# Patient Record
Sex: FEMALE | Race: ASIAN
[De-identification: names, ages, dates, MRNs, and addresses within clinical notes are randomized per-mention and may not be internally consistent; named-entity substitution may affect disease eponyms.]

---

## 2018-08-15 ENCOUNTER — HOSPITAL ENCOUNTER (OUTPATIENT)
Dept: HOSPITAL 53 - M LAB REF | Age: 63
End: 2018-08-15
Payer: COMMERCIAL

## 2018-08-15 DIAGNOSIS — Z01.419: Primary | ICD-10-CM

## 2018-08-21 LAB — HPV HYBRID CAPTURE II: (no result)

## 2019-03-15 ENCOUNTER — HOSPITAL ENCOUNTER (OUTPATIENT)
Dept: HOSPITAL 53 - M LAB | Age: 64
End: 2019-03-15
Attending: PHYSICIAN ASSISTANT
Payer: COMMERCIAL

## 2019-03-15 DIAGNOSIS — E87.6: Primary | ICD-10-CM

## 2019-03-15 LAB
ALBUMIN SERPL BCG-MCNC: 3.9 GM/DL (ref 3.2–5.2)
ALT SERPL W P-5'-P-CCNC: 19 U/L (ref 12–78)
BILIRUB SERPL-MCNC: 0.4 MG/DL (ref 0.2–1)
BUN SERPL-MCNC: 15 MG/DL (ref 7–18)
CALCIUM SERPL-MCNC: 9 MG/DL (ref 8.8–10.2)
CHLORIDE SERPL-SCNC: 101 MEQ/L (ref 98–107)
CO2 SERPL-SCNC: 33 MEQ/L (ref 21–32)
CREAT SERPL-MCNC: 0.59 MG/DL (ref 0.55–1.3)
GFR SERPL CREATININE-BSD FRML MDRD: > 60 ML/MIN/{1.73_M2} (ref 45–?)
GLUCOSE SERPL-MCNC: 89 MG/DL (ref 70–100)
POTASSIUM SERPL-SCNC: 3.8 MEQ/L (ref 3.5–5.1)
PROT SERPL-MCNC: 7.2 GM/DL (ref 6.4–8.2)
SODIUM SERPL-SCNC: 138 MEQ/L (ref 136–145)

## 2020-02-08 ENCOUNTER — HOSPITAL ENCOUNTER (EMERGENCY)
Dept: HOSPITAL 53 - M ED | Age: 65
Discharge: HOME | End: 2020-02-08
Payer: COMMERCIAL

## 2020-02-08 VITALS — SYSTOLIC BLOOD PRESSURE: 134 MMHG | DIASTOLIC BLOOD PRESSURE: 72 MMHG

## 2020-02-08 VITALS — WEIGHT: 130.07 LBS | BODY MASS INDEX: 22.21 KG/M2 | HEIGHT: 64 IN

## 2020-02-08 DIAGNOSIS — K21.9: ICD-10-CM

## 2020-02-08 DIAGNOSIS — R10.13: Primary | ICD-10-CM

## 2020-02-08 DIAGNOSIS — Z88.6: ICD-10-CM

## 2020-02-08 LAB
ALBUMIN SERPL BCG-MCNC: 3.9 GM/DL (ref 3.2–5.2)
ALT SERPL W P-5'-P-CCNC: 22 U/L (ref 12–78)
AMYLASE SERPL-CCNC: 47 U/L (ref 25–115)
BASOPHILS # BLD AUTO: 0.1 10^3/UL (ref 0–0.2)
BASOPHILS NFR BLD AUTO: 1 % (ref 0–1)
BILIRUB CONJ SERPL-MCNC: 0.1 MG/DL (ref 0–0.2)
BILIRUB SERPL-MCNC: 0.6 MG/DL (ref 0.2–1)
BUN SERPL-MCNC: 11 MG/DL (ref 7–18)
CALCIUM SERPL-MCNC: 8.9 MG/DL (ref 8.8–10.2)
CHLORIDE SERPL-SCNC: 100 MEQ/L (ref 98–107)
CK MB CFR.DF SERPL CALC: 1.6
CK MB SERPL-MCNC: 1.6 NG/ML (ref ?–3.6)
CK SERPL-CCNC: 100 U/L (ref 26–192)
CO2 SERPL-SCNC: 30 MEQ/L (ref 21–32)
CREAT SERPL-MCNC: 0.58 MG/DL (ref 0.55–1.3)
EOSINOPHIL # BLD AUTO: 0 10^3/UL (ref 0–0.5)
EOSINOPHIL NFR BLD AUTO: 0.5 % (ref 0–3)
GFR SERPL CREATININE-BSD FRML MDRD: > 60 ML/MIN/{1.73_M2} (ref 45–?)
GLUCOSE SERPL-MCNC: 93 MG/DL (ref 70–100)
HCT VFR BLD AUTO: 35.2 % (ref 36–47)
HGB BLD-MCNC: 12 G/DL (ref 12–15.5)
LIPASE SERPL-CCNC: 107 U/L (ref 73–393)
LYMPHOCYTES # BLD AUTO: 2.2 10^3/UL (ref 1.5–5)
LYMPHOCYTES NFR BLD AUTO: 37.2 % (ref 24–44)
MCH RBC QN AUTO: 31.9 PG (ref 27–33)
MCHC RBC AUTO-ENTMCNC: 34.1 G/DL (ref 32–36.5)
MCV RBC AUTO: 93.6 FL (ref 80–96)
MONOCYTES # BLD AUTO: 0.4 10^3/UL (ref 0–0.8)
MONOCYTES NFR BLD AUTO: 6.7 % (ref 0–5)
NEUTROPHILS # BLD AUTO: 3.3 10^3/UL (ref 1.5–8.5)
NEUTROPHILS NFR BLD AUTO: 54.4 % (ref 36–66)
PLATELET # BLD AUTO: 308 10^3/UL (ref 150–450)
POTASSIUM SERPL-SCNC: 3.1 MEQ/L (ref 3.5–5.1)
PROT SERPL-MCNC: 6.9 GM/DL (ref 6.4–8.2)
RBC # BLD AUTO: 3.76 10^6/UL (ref 4–5.4)
SODIUM SERPL-SCNC: 137 MEQ/L (ref 136–145)
TROPONIN I SERPL-MCNC: 0.03 NG/ML (ref ?–0.1)
WBC # BLD AUTO: 6 10^3/UL (ref 4–10)

## 2020-02-08 NOTE — ECGEPIP
[facility] - ED

                                       

                                       Test Date:    2020

Pat Name:     ATUL CHENEY         Department:   

Patient ID:   [id number]                 Room:         -

Gender:       Female                   Technician:   magdalene

:          1955               Requested By: Maja Lundborg-Gray 

Order Number: [id number]     Reading MD:   Maja Lundborg-Gray

                                 Measurements

Intervals                              Axis          

Rate:         74                       P:            54

OK:           140                      QRS:          46

QRSD:         93                       T:            51

QT:           391                                    

QTc:          436                                    

                           Interpretive Statements

SINUS RHYTHM

NONSPECIFIC ST T WAVE CHANGES

NO PRIOR ECG FOR COMPARISON

Electronically Signed on 2020 19:21:58 EST by Maja Lundborg-Gray

## 2020-02-08 NOTE — REPVR
PROCEDURE INFORMATION: 

Exam: US Limited Retroperitoneal, Aorta. 

Exam date and time: 2/8/2020 5:13 PM 

Age: 64 years old 

Clinical indication: Abdominal pain; Epigastric; Additional info: Epigastric 

pain, radiates to back 



TECHNIQUE: 

Imaging protocol: Real-time ultrasound of the retroperitoneum with image 

documentation. Exam focused on the aorta. 



COMPARISON: 

No relevant prior studies available. 



FINDINGS: 

Aorta: Mild atherosclerotic changes in the abdominal aorta. Proximal aorta 

measures 2 x 2.8 cm, mid aorta 1.8 x 1.9 cm, and distal aorta 1.5 x 1.7 cm. No 

aneurysm demonstrated. 

Common iliac arteries: Right common iliac artery measures 1 x 1 cm. Left iliac 

artery measures 1.1 x 1.1 cm. 



IMPRESSION: 

Mild atherosclerotic changes in the abdominal aorta. No aneurysm demonstrated.



Electronically signed by: Efrain Schuler On 02/08/2020  17:34:59 PM

## 2020-02-08 NOTE — REP
Clinical:  Epigastric pain.

 

 

Comparison: None .

 

Technique:  PA and lateral.

 

Findings:

The mediastinum and cardiac silhouette are normal.  The lung fields are clear and

without acute consolidation, effusion, or pneumothorax.  The skeletal structures

are intact and normal.

 

Impression:

1.   No acute cardiopulmonary process.

 

 

Electronically Signed by

Hugo Donald MD 02/08/2020 04:09 P

## 2020-03-03 ENCOUNTER — HOSPITAL ENCOUNTER (OUTPATIENT)
Dept: HOSPITAL 53 - M RAD | Age: 65
End: 2020-03-03
Attending: NURSE PRACTITIONER
Payer: COMMERCIAL

## 2020-03-03 DIAGNOSIS — K21.9: Primary | ICD-10-CM

## 2020-03-03 NOTE — REP
Examination Requested: Esophagram Barium Swallow

 

Reason For Exam/Comment: Dysphasia

 

Esophagram:

 

The procedure was performed LEN Andujar, under the direct

supervision of Dr. Salazar. The images were reviewed with Dr. Salazar.

 

A single PA chest x-ray is submitted as a  film.  The superior mediastinal

structures are midline.  The heart size is within normal limits.  The lungs are

clear.

 

Liquid barium and gas producing granules were given in the erect position as well

as liquid barium in the prone oblique position, in order to perform a double

contrast esophagram examination.

 

Oral and pharyngeal stages of the examination were unremarkable.  Esophageal

transport is efficient and there is no esophagitis, stricture, or mucosal ring

noted.  There is no hiatal hernia noted.  Gastroesophageal reflux was visualized

to the level of the eric.

 

Impression:

1. Gastroesophageal reflux to the level of the eric.

 

0.3 minutes of fluoroscopy time was utilized for this procedure. Some

fluoroscopic images are performed with last image hold technology.  These images

require no additional radiation.

 

 

Reviewed by

LEN Su 03/03/2020 12:23 P

Electronically Signed by

Mic Salazar MD 03/03/2020 05:51 P

## 2020-03-18 ENCOUNTER — HOSPITAL ENCOUNTER (OUTPATIENT)
Dept: HOSPITAL 53 - M OPP | Age: 65
Discharge: HOME | End: 2020-03-18
Attending: SURGERY
Payer: COMMERCIAL

## 2020-03-18 VITALS — SYSTOLIC BLOOD PRESSURE: 146 MMHG | DIASTOLIC BLOOD PRESSURE: 74 MMHG

## 2020-03-18 VITALS — HEIGHT: 64 IN | WEIGHT: 123 LBS | BODY MASS INDEX: 21 KG/M2

## 2020-03-18 DIAGNOSIS — K29.70: ICD-10-CM

## 2020-03-18 DIAGNOSIS — Z88.6: ICD-10-CM

## 2020-03-18 DIAGNOSIS — Z79.899: ICD-10-CM

## 2020-03-18 DIAGNOSIS — R13.10: ICD-10-CM

## 2020-03-18 DIAGNOSIS — R10.13: ICD-10-CM

## 2020-03-18 DIAGNOSIS — K21.9: ICD-10-CM

## 2020-03-18 DIAGNOSIS — D13.1: ICD-10-CM

## 2020-03-18 DIAGNOSIS — K64.0: ICD-10-CM

## 2020-03-18 DIAGNOSIS — Z87.891: ICD-10-CM

## 2020-03-18 DIAGNOSIS — I10: ICD-10-CM

## 2020-03-18 DIAGNOSIS — Z12.11: Primary | ICD-10-CM

## 2020-03-18 NOTE — ROOR
________________________________________________________________________________

Patient Name: Edel Canales         Procedure Date: 3/18/2020 8:45 AM

MRN: [id number]                          Account Number: [id number]

YOB: 1955              Age: 64

Room: [facility]                            Gender: Female

Note Status: Finalized                 

________________________________________________________________________________

 

Procedure:           Upper GI endoscopy

Indications:         Suspected gastro-esophageal reflux disease

Providers:           DO Anusha Landry MD:        LAISHA Lindsey

Requesting Provider: 

Medicines:           Propofol per Anesthesia

Complications:       No immediate complications.

________________________________________________________________________________

Procedure:           Pre-Anesthesia Assessment:

                     - Prior to the procedure, a History and Physical was 

                     performed, and patient medications and allergies were 

                     reviewed. The patient is competent. The risks and 

                     benefits of the procedure and the sedation options and 

                     risks were discussed with the patient. All questions were 

                     answered and informed consent was obtained. Patient 

                     identification and proposed procedure were verified by 

                     the physician, the nurse, the anesthesiologist and the 

                     technician in the endoscopy suite. Mental Status 

                     Examination: alert and oriented. Airway Examination: 

                     normal oropharyngeal airway and neck mobility. 

                     Respiratory Examination: clear to auscultation. CV 

                     Examination: normal. Prophylactic Antibiotics: The 

                     patient does not require prophylactic antibiotics. Prior 

                     Anticoagulants: The patient has taken no previous 

                     anticoagulant or antiplatelet agents. ASA Grade 

                     Assessment: II - A patient with mild systemic disease. 

                     After reviewing the risks and benefits, the patient was 

                     deemed in satisfactory condition to undergo the 

                     procedure. The anesthesia plan was to use monitored 

                     anesthesia care (MAC). Immediately prior to 

                     administration of medications, the patient was 

                     re-assessed for adequacy to receive sedatives. The heart 

                     rate, respiratory rate, oxygen saturations, blood 

                     pressure, adequacy of pulmonary ventilation, and response 

                     to care were monitored throughout the procedure. The 

                     physical status of the patient was re-assessed after the 

                     procedure.

                     The Endoscope was introduced through the mouth, and 

                     advanced to the second part of duodenum. The upper GI 

                     endoscopy was accomplished without difficulty. The 

                     patient tolerated the procedure well.

                                                                                

Findings:

     Scattered moderate inflammation characterized by adherent blood, 

     congestion (edema), erosions and linear erosions was found in the 

     prepyloric region of the stomach. Biopsies were taken with a cold forceps 

     for Helicobacter pylori testing. Estimated blood loss was minimal.

     The exam was otherwise without abnormality.

                                                                                

Impression:          - Gastritis. Biopsied.

                     - The examination was otherwise normal.

Recommendation:      - Patient has a contact number available for emergencies. 

                     The signs and symptoms of potential delayed complications 

                     were discussed with the patient. Return to normal 

                     activities tomorrow. Written discharge instructions were 

                     provided to the patient.

                     - Await pathology results.

                     - Return to my office in 1 week.

                                                                                

 

_________________

Chris Krishna DO

3/18/2020 9:18:30 AM

Electronically signed by Chris Krishna DO

Number of Addenda: 0

 

Note Initiated On: 3/18/2020 8:45 AM

Estimated Blood Loss:

     Estimated blood loss was minimal.

## 2020-03-18 NOTE — ROOR
________________________________________________________________________________

Patient Name: Edel Canales         Procedure Date: 3/18/2020 8:45 AM

MRN: [id number]                          Account Number: [id number]

YOB: 1955              Age: 64

Room: [facility]                            Gender: Female

Note Status: Finalized                 

________________________________________________________________________________

 

Procedure:           Colonoscopy

Indications:         Screening for colorectal malignant neoplasm

Providers:           DO Anusha Landry MD:        LAISHA Lindsey

Requesting Provider: 

Medicines:           Propofol per Anesthesia

Complications:       No immediate complications.

________________________________________________________________________________

Procedure:           Pre-Anesthesia Assessment:

                     - Prior to the procedure, a History and Physical was 

                     performed, and patient medications and allergies were 

                     reviewed. The patient is competent. The risks and 

                     benefits of the procedure and the sedation options and 

                     risks were discussed with the patient. All questions were 

                     answered and informed consent was obtained. Patient 

                     identification and proposed procedure were verified by 

                     the physician, the nurse, the anesthesiologist and the 

                     technician in the endoscopy suite. Mental Status 

                     Examination: alert and oriented. Airway Examination: 

                     normal oropharyngeal airway and neck mobility. 

                     Respiratory Examination: clear to auscultation. CV 

                     Examination: normal. Prophylactic Antibiotics: The 

                     patient does not require prophylactic antibiotics. Prior 

                     Anticoagulants: The patient has taken no previous 

                     anticoagulant or antiplatelet agents. ASA Grade 

                     Assessment: II - A patient with mild systemic disease. 

                     After reviewing the risks and benefits, the patient was 

                     deemed in satisfactory condition to undergo the 

                     procedure. The anesthesia plan was to use monitored 

                     anesthesia care (MAC). Immediately prior to 

                     administration of medications, the patient was 

                     re-assessed for adequacy to receive sedatives. The heart 

                     rate, respiratory rate, oxygen saturations, blood 

                     pressure, adequacy of pulmonary ventilation, and response 

                     to care were monitored throughout the procedure. The 

                     physical status of the patient was re-assessed after the 

                     procedure.

                     The Colonoscope was introduced through the anus and 

                     advanced to the cecum, identified by appendiceal orifice 

                     and ileocecal valve. The colonoscopy was performed 

                     without difficulty. The patient tolerated the procedure 

                     well.

                                                                                

Findings:

     Hemorrhoids were found on perianal exam.

     Non-bleeding internal hemorrhoids were found during retroflexion. The 

     hemorrhoids were Grade I (internal hemorrhoids that do not prolapse).

     The exam was otherwise without abnormality on direct and retroflexion 

     views.

                                                                                

Impression:          - Hemorrhoids found on perianal exam.

                     - Non-bleeding internal hemorrhoids.

                     - The examination was otherwise normal on direct and 

                     retroflexion views.

                     - No specimens collected.

Recommendation:      - Patient has a contact number available for emergencies. 

                     The signs and symptoms of potential delayed complications 

                     were discussed with the patient. Return to normal 

                     activities tomorrow. Written discharge instructions were 

                     provided to the patient.

                     - Repeat colonoscopy in 5-10 years for screening purposes.

                     - Return to my office PRN.

                                                                                

 

_________________

Chris Krishna DO

3/18/2020 9:21:29 AM

Electronically signed by Chris Krishna DO

Number of Addenda: 0

 

Note Initiated On: 3/18/2020 8:45 AM

Estimated Blood Loss:

     Estimated blood loss: none.

## 2020-09-25 ENCOUNTER — HOSPITAL ENCOUNTER (OUTPATIENT)
Dept: HOSPITAL 53 - M LAB | Age: 65
End: 2020-09-25
Attending: PHYSICIAN ASSISTANT
Payer: COMMERCIAL

## 2020-09-25 DIAGNOSIS — I10: ICD-10-CM

## 2020-09-25 DIAGNOSIS — K21.9: Primary | ICD-10-CM

## 2020-09-25 LAB
ALBUMIN SERPL BCG-MCNC: 3.7 GM/DL (ref 3.2–5.2)
ALT SERPL W P-5'-P-CCNC: 20 U/L (ref 12–78)
BASOPHILS # BLD AUTO: 0.1 10^3/UL (ref 0–0.2)
BASOPHILS NFR BLD AUTO: 0.9 % (ref 0–1)
BILIRUB SERPL-MCNC: 0.5 MG/DL (ref 0.2–1)
BUN SERPL-MCNC: 16 MG/DL (ref 7–18)
CALCIUM SERPL-MCNC: 8.7 MG/DL (ref 8.8–10.2)
CHLORIDE SERPL-SCNC: 109 MEQ/L (ref 98–107)
CHOLEST SERPL-MCNC: 179 MG/DL (ref ?–200)
CHOLEST/HDLC SERPL: 3.09 {RATIO} (ref ?–5)
CO2 SERPL-SCNC: 28 MEQ/L (ref 21–32)
CREAT SERPL-MCNC: 0.6 MG/DL (ref 0.55–1.3)
EOSINOPHIL # BLD AUTO: 0.1 10^3/UL (ref 0–0.5)
EOSINOPHIL NFR BLD AUTO: 1.7 % (ref 0–3)
GFR SERPL CREATININE-BSD FRML MDRD: > 60 ML/MIN/{1.73_M2} (ref 45–?)
GLUCOSE SERPL-MCNC: 97 MG/DL (ref 70–100)
HCT VFR BLD AUTO: 38.7 % (ref 36–47)
HDLC SERPL-MCNC: 58 MG/DL (ref 40–?)
HGB BLD-MCNC: 12.8 G/DL (ref 12–15.5)
LDLC SERPL CALC-MCNC: 107 MG/DL (ref ?–100)
LYMPHOCYTES # BLD AUTO: 2.3 10^3/UL (ref 1.5–5)
LYMPHOCYTES NFR BLD AUTO: 44.2 % (ref 24–44)
MCH RBC QN AUTO: 31.5 PG (ref 27–33)
MCHC RBC AUTO-ENTMCNC: 33.1 G/DL (ref 32–36.5)
MCV RBC AUTO: 95.3 FL (ref 80–96)
MONOCYTES # BLD AUTO: 0.4 10^3/UL (ref 0–0.8)
MONOCYTES NFR BLD AUTO: 7.2 % (ref 0–5)
NEUTROPHILS # BLD AUTO: 2.4 10^3/UL (ref 1.5–8.5)
NEUTROPHILS NFR BLD AUTO: 45.6 % (ref 36–66)
NONHDLC SERPL-MCNC: 121 MG/DL
PLATELET # BLD AUTO: 296 10^3/UL (ref 150–450)
POTASSIUM SERPL-SCNC: 3.6 MEQ/L (ref 3.5–5.1)
PROT SERPL-MCNC: 6.9 GM/DL (ref 6.4–8.2)
RBC # BLD AUTO: 4.06 10^6/UL (ref 4–5.4)
SODIUM SERPL-SCNC: 143 MEQ/L (ref 136–145)
TRIGL SERPL-MCNC: 72 MG/DL (ref ?–150)
TSH SERPL DL<=0.005 MIU/L-ACNC: 2.67 UIU/ML (ref 0.36–3.74)
WBC # BLD AUTO: 5.3 10^3/UL (ref 4–10)

## 2020-10-22 ENCOUNTER — HOSPITAL ENCOUNTER (OUTPATIENT)
Dept: HOSPITAL 53 - M LAB REF | Age: 65
End: 2020-10-22
Attending: NURSE PRACTITIONER
Payer: COMMERCIAL

## 2020-10-22 DIAGNOSIS — R31.9: Primary | ICD-10-CM

## 2021-02-13 ENCOUNTER — HOSPITAL ENCOUNTER (EMERGENCY)
Dept: HOSPITAL 53 - M ED | Age: 66
Discharge: HOME | End: 2021-02-13
Payer: COMMERCIAL

## 2021-02-13 VITALS
BODY MASS INDEX: 22.62 KG/M2 | DIASTOLIC BLOOD PRESSURE: 73 MMHG | HEIGHT: 64 IN | WEIGHT: 132.5 LBS | SYSTOLIC BLOOD PRESSURE: 143 MMHG

## 2021-02-13 DIAGNOSIS — I10: ICD-10-CM

## 2021-02-13 DIAGNOSIS — Z88.8: ICD-10-CM

## 2021-02-13 DIAGNOSIS — H93.11: Primary | ICD-10-CM

## 2021-02-13 DIAGNOSIS — Z79.899: ICD-10-CM

## 2021-02-13 DIAGNOSIS — K21.9: ICD-10-CM

## 2021-02-13 NOTE — CCD
Summarization Of Episode

                             Created on: 2021



SHRAVAN ATUL

External Reference #: [id number]

: 1955

Sex: Female



Demographics





                          Address                   [address] [place]  [address]

 

                          Home Phone                [phone]

 

                          Preferred Language        English

 

                          Marital Status            

 

                          Confucianist Affiliation     PRE WATKOR

 

                          Race                      

 

                          Ethnic Group              Not  or 





Author





                          Author                    [facility]

 

                          Organization              [facility]

 

                          Address                   Unknown

 

                          Phone                     Unavailable







Support





                Name            Relationship    Address         Phone

 

                RE              Next Of Kin     Unknown         Unavailable

 

                UE              Next Of Kin     Unknown         Unavailable

 

                    TANYA BARNES  Next Of Kin         [address]

[place]  [address]                     [phone]

 

                VANE LAUREN CHA Next Of Kin     Unknown         Unavailab

[address]     [place]  [address]                     [phone]

 

                    JOSE HORNER Next Of Kin         [address]

[place]  [address]                     [phone]

 

                SHAHEED HORNER Next Of Kin     Unknown         [phone]

 

                UN              Next Of Kin     Unknown         Unavailable

 

                    NONE, PATIENT PER   Next Of Kin         -

                                        -

                          -, -  -                   -

 

                    CATINA CHENEY    Next Of Kin         [address]

[place]  [address] [phone]







Care Team Providers





                    Care Team Member Name Role                Phone

 

                    Scaarono, M Ese PA Unavailable         Unavailable

 

                    Scordo, M Ese PA Unavailable         Unavailable

 

                    Scordo, M Ese PA Unavailable         Unavailable

 

                    Scordo, M Ese PA Unavailable         Unavailable

 

                    Scordo, M Ese PA Unavailable         Unavailable

 

                    Scordo, M Ese PA Unavailable         Unavailable

 

                    Scordo, M Ese PA Unavailable         Unavailable

 

                    Scordo, M Ese PA Unavailable         Unavailable

 

                    Scordo, M Ese PA Unavailable         Unavailable

 

                    Scordo, M Ese PA Unavailable         Unavailable

 

                    Scordo, M Ese PA Unavailable         Unavailable

 

                    Scordo, M Ese PA Unavailable         Unavailable

 

                    Scordo, M Ese PA Unavailable         Unavailable

 

                    Scordo, M Ese PA Unavailable         Unavailable

 

                    Scordo, M Ese PA Unavailable         Unavailable

 

                    Scordo, M Ese PA Unavailable         Unavailable

 

                    Scordo, M Ese PA Unavailable         Unavailable

 

                    Scordo, M Ese PA Unavailable         Unavailable

 

                    Scordo, M Ese PA Unavailable         Unavailable

 

                    Scordo, M Ese PA Unavailable         Unavailable

 

                    Scordo, M Ese PA Unavailable         Unavailable

 

                    Scordo, M Ese PA Unavailable         Unavailable

 

                    Scordo, M Ese PA Unavailable         Unavailable

 

                    Scordo, M Ese PA Unavailable         Unavailable

 

                    Scordo, M Ese PA Unavailable         Unavailable

 

                    Scordo, M Ese PA Unavailable         Unavailable

 

                    Scordo, M Ese PA Unavailable         Unavailable

 

                    Scordo, M Ese PA Unavailable         Unavailable

 

                    Scordo, M Ese PA Unavailable         Unavailable

 

                    Scordo, M Ese PA Unavailable         Unavailable

 

                    Scordo, M Ese PA Unavailable         Unavailable

 

                    Scordo, M Ese PA Unavailable         Unavailable

 

                    Scordo, M Ese PA Unavailable         Unavailable

 

                    Scordo, M Ese PA Unavailable         Unavailable

 

                    Scordo, M Ese PA Unavailable         Unavailable

 

                    Scordo, M Ese PA Unavailable         Unavailable

 

                    Scordo, M Ese PA Unavailable         Unavailable

 

                    Scordo, M Ese PA Unavailable         Unavailable

 

                    Scordo, M Ese PA Unavailable         Unavailable

 

                    Scordo, M Ese PA Unavailable         Unavailable

 

                    Scordo, M Ese PA Unavailable         Unavailable

 

                    Scordo, M Ese PA Unavailable         Unavailable

 

                    Pleskach,  Agatha FNP Unavailable         Unavailable

 

                    Pleskach,  Agatha FNP Unavailable         Unavailable

 

                    Pleskach,  Agatha FNP Unavailable         Unavailable

 

                    Pleskach,  Agatha FNP Unavailable         Unavailable

 

                    Pleskach,  Agatha FNP Unavailable         Unavailable

 

                    Pleskach,  Agatha FNP Unavailable         Unavailable

 

                    Pleskach,  Agatha FNP Unavailable         Unavailable

 

                    Pleskach,  Agatha FNP Unavailable         Unavailable

 

                    Pleskach,  Agatha FNP Unavailable         Unavailable

 

                    Pleskach,  Agatha FNP Unavailable         Unavailable

 

                    Pleskach,  Agatha FNP Unavailable         Unavailable

 

                    Pleskach,  Agatha FNP Unavailable         Unavailable

 

                    Pleskach,  Agatha FNP Unavailable         Unavailable

 

                    Pleskach,  Agatha FNP Unavailable         Unavailable

 

                    Pleskach,  Agatha FNP Unavailable         Unavailable

 

                    Pleskach,  Agatha FNP Unavailable         Unavailable

 

                    Pleskach,  Agatha FNP Unavailable         Unavailable

 

                    Pleskach,  Agatha FNP Unavailable         Unavailable

 

                    Pleskach,  Agatha FNP Unavailable         Unavailable

 

                    Pleskach,  Agatha FNP Unavailable         Unavailable

 

                    Pleskach,  Agatha FNP Unavailable         Unavailable

 

                    Pleskach,  Agatha FNP Unavailable         Unavailable

 

                    Pleskach,  Agatha FNP Unavailable         Unavailable

 

                    Pleskach,  Agatha FNP Unavailable         Unavailable

 

                    Pleskach,  Agatha FNP Unavailable         Unavailable

 

                    Pleskach,  Agatha FNP Unavailable         Unavailable

 

                    Pleskach,  Agatha FNP Unavailable         Unavailable

 

                    Pleskach,  Agatha FNP Unavailable         Unavailable

 

                    Pleskach,  Agatha FNP Unavailable         Unavailable

 

                    Pleskach,  Agatha FNP Unavailable         Unavailable

 

                    Scordo, M Ese PA Unavailable         Unavailable

 

                    Scordo, M Ese PA Unavailable         Unavailable

 

                    Scordo, M Ese PA Unavailable         Unavailable

 

                    Scordo, M Ese PA Unavailable         Unavailable

 

                    Scordo, M Ese PA Unavailable         Unavailable

 

                    Scordo, M Ese PA Unavailable         Unavailable

 

                    Scordo, M Ese PA Unavailable         Unavailable

 

                    Scordo, M Ese PA Unavailable         Unavailable

 

                    Scordo, M Ese PA Unavailable         Unavailable

 

                    Scordo, M Ese PA Unavailable         Unavailable

 

                    Scordo, M Ese PA Unavailable         Unavailable

 

                    Scordo, M Ese PA Unavailable         Unavailable

 

                    Scordo, M Ese PA Unavailable         Unavailable

 

                    Scordo, M Ese PA Unavailable         Unavailable

 

                    Scordo, M Ese PA Unavailable         Unavailable

 

                    Scordo, M Ese PA Unavailable         Unavailable

 

                    Scordo, M Ese PA Unavailable         Unavailable

 

                    Scordo, M Ese PA Unavailable         Unavailable

 

                    Scordo, M Ese PA Unavailable         Unavailable

 

                    Scordo, M Ese PA Unavailable         Unavailable

 

                    Scordo, M Ese PA Unavailable         Unavailable

 

                    Scordo, M Ese PA Unavailable         Unavailable

 

                    Scordo, M Ese PA Unavailable         Unavailable

 

                    Scordo, M Ese PA Unavailable         Unavailable

 

                    Scordo, M Ese PA Unavailable         Unavailable

 

                    Scordo, M Ese PA Unavailable         Unavailable

 

                    Scordo, M Ese PA Unavailable         Unavailable

 

                    Scordo, M Ese PA Unavailable         Unavailable

 

                    Scordo, M Ese PA Unavailable         Unavailable

 

                    Scordo, M Ese PA Unavailable         Unavailable

 

                    Scordo, M Ese PA Unavailable         Unavailable

 

                    Scordo, M Ese PA Unavailable         Unavailable

 

                    Scordo, M Ese PA Unavailable         Unavailable

 

                    Scordo, M Ese PA Unavailable         Unavailable

 

                    Scordo, M Ese PA Unavailable         Unavailable

 

                    Scordo, M Ese PA Unavailable         Unavailable

 

                    Scordo, M Ese PA Unavailable         Unavailable

 

                    Scordo, M Ese PA Unavailable         Unavailable

 

                    Scordo, M Ese PA Unavailable         Unavailable

 

                    Scordo, M Ese PA Unavailable         Unavailable

 

                    Scordo, M Ese PA Unavailable         Unavailable

 

                    Scordo, M Ese PA Unavailable         Unavailable



                                  



Re-disclosure Warning

          The records that you are about to access may contain information from 
federally-assisted alcohol or drug abuse programs. If such information is 
present, then the following federally mandated warning applies: This information
has been disclosed to you from records protected by federal confidentiality 
rules (42 CFR part 2). The federal rules prohibit you from making any further 
disclosure of this information unless further disclosure is expressly permitted 
by the written consent of the person to whom it pertains or as otherwise 
permitted by 42 CFR part 2. A general authorization for the release of medical 
or other information is NOT sufficient for this purpose. The Federal rules 
restrict any use of the information to criminally investigate or prosecute any 
alcohol or drug abuse patient.The records that you are about to access may 
contain highly sensitive health information, the redisclosure of which is 
protected by Article 27-F of the [facility] Public Health law. If you 
continue you may have access to information: Regarding HIV / AIDS; Provided by 
facilities licensed or operated by the [facility] Office of Mental Health; 
or Provided by the [facility] Office for People With Developmental 
Disabilities. If such information is present, then the following [place] 
mandated warning applies: This information has been disclosed to you from 
confidential records which are protected by state law. State law prohibits you 
from making any further disclosure of this information without the specific 
written consent of the person to whom it pertains, or as otherwise permitted by 
law. Any unauthorized further disclosure in violation of state law may result in
a fine or [facility] sentence or both. A general authorization for the release of 
medical or other information is NOT sufficient authorization for further disc
losure.                                                                         
    



Family History

          



             Family Member Name Family Member Gender Family Member Status Date o

f Status 

Description                             Data Source(s)

 

           Unknown    Unknown    Problem                          [facility]

 

           Unknown    Unknown    Problem                          [facility]

 

           Unknown    Female     Problem                          [facility]



                                                                                
                           



Encounters

          



           Encounter  Providers  Location   Date       Indications Data Source(s

)

 

             Outpatient   Attender: Agatha John [facility] Main Office  02/10/2021 0

9:00:00 AM EST  

                                        [facility]

 

             Outpatient   Attender: Agatha John [facility]  10/22/2020 0

9:45:00 AM EDT  

                                        [facility]

 

             Outpatient   Attender: Agatha John [facility] Main [facility]  10/06/2020 1

0:45:00 AM EDT  

                                        [facility]

 

           Outpatient Attender: Ese INTERIANO            2020 10:23:00 

AM EDT SCREEN     [facility]

 

                                        SCREEN 

 

           Outpatient Attender: Ese INTERIANO Main Office 2020 10:00:00

 AM EDT            

[facility]

 

             Outpatient   Attender: Ese INTERIANO              2020 01:

00:00 PM EDT SCREEN, DENSE

Z12.31                                  [facility]

 

                                        SCREEN, DENSE Z12.31 

 

           Outpatient                       2020 08:03:00 AM EST          

  [facility]

 

           Outpatient Attender: Ese INTERIANO Main Office 2020 09:15:00

 AM EST            

[facility]



                                                                                
                                                                             



Immunizations

          



             Vaccine      Date         Status       Description  Data Source(s)

 

                          INFLUENZA VIRUS VACCINE QUADRIVALENT 2020-21 (6 MOS AN

D UP) 2020 12:00:00 

AM EDT              completed                               [facility]

 

                          VARICELLA-ZOSTER VIRUS GLYCOPROTEIN E,REC/AS01B ADJUVA

NT/PF 2020 12:00:00 

AM EDT              completed                               [facility]

 

             pneumococcal polysaccharide PPV23 2020 07:24:00 AM EDT comple

florentino                 [facility] 

([facility]

 

                    PNEUMOCOCCAL 23-VALENT POLYSACCHARIDE VACCINE 2020 12:

00:00 AM EDT 

completed                                           [facility]



                                                                                
                                     



Medications

          



          Medication Brand Name Start Date Product Form Dose      Route     Admi

nistrative 

Instructions Pharmacy Instructions Status     Indications Reaction   Description

 Data 

Source(s)

 

                    levocetirizine dihydrochloride 5 MG Oral Tablet Levocetirizi

ne Dihydrochloride 

02/10/2021 12:00:00 AM EST               ORAL                 active            

          [facility]

 

        Azithromycin 250 MG Oral Tablet Azithromycin 02/10/2021 12:00:00 AM EST 

                                

             active                                              [facility]

 

          250 mg              02/10/2021 12:00:00 AM EST tablet    6            

       TAKE TWO TABLETS BY MOUTH AT ONCE

ON THE FIRST DAY THEN TAKE ONE DAILY THEREAFTER TAKE TWO TABLETS BY MOUTH AT 

ONCE ON THE FIRST DAY THEN TAKE ONE DAILY THEREAFTER SOLD: 02/10/2021           

                             

[facility]

 

          0.05 %              10/07/2020 12:00:00 AM EDT ointment  15           

       APPLY TWO TIMES A DAY TO RASH 

ON HANDS AS NEEDED        APPLY TWO TIMES A DAY TO RASH ON HANDS AS NEEDED SOLD:

 

10/08/2020                                                      [facility]

 

          20 mg               10/07/2020 12:00:00 AM EDT tablet    90           

       TAKE ONE TABLET BY MOUTH DAILY 

FOR GUM DISEASE     TAKE ONE TABLET BY MOUTH DAILY FOR GUM DISEASE SOLD: 10/08/2

020    

                                                            [facility]

 

          20 mg               10/07/2020 12:00:00 AM EDT tablet    90           

       TAKE ONE TABLET BY MOUTH DAILY 

FOR GUM DISEASE     TAKE ONE TABLET BY MOUTH DAILY FOR GUM DISEASE SOLD: 01/10/2

021    

                                                            [facility]

 

          3.5-10,000-1 mg/mL-unit/mL-%           10/06/2020 12:00:00 AM EDT solu

tion  10                  APPLY TO

EARS EVERYDAY AS NEEDED FOR ITCHING     APPLY TO EARS EVERYDAY AS NEEDED FOR ITC

SEUN

             SOLD: 10/08/2020                                        [facility]

s

 

          3-5 %               10/06/2020 12:00:00 AM EDT gel       46           

       APPLY TO ACNE AT BEDTIME, WASH OFF 

IN THE MORNING            APPLY TO ACNE AT BEDTIME, WASH OFF IN THE MORNING SOLD

: 

10/08/2020                                                      [facility]

 

          3-5 %               10/06/2020 12:00:00 AM EDT gel       46           

       APPLY TO ACNE AT BEDTIME, WASH OFF 

IN THE MORNING            APPLY TO ACNE AT BEDTIME, WASH OFF IN THE MORNING SOLD

: 

2020                                                      [facility]

 

          3-5 %               10/06/2020 12:00:00 AM EDT gel       46           

       APPLY TO ACNE AT BEDTIME, WASH OFF 

IN THE MORNING            APPLY TO ACNE AT BEDTIME, WASH OFF IN THE MORNING SOLD

: 

2021                                                      [facility]

 

                                        Hydrocortisone 10 MG/ML / Neomycin 3.5 M

G/ML / Polymyxin B 49130 UNT/ML [facility] 

Solution        Neomycin/Polymyxin/Hydrocortisone (Otic) 10/06/2020 12:00:00 AM 

EDT                  

                                active                          [facility]

 

        . UNIT          2020 12:00:00 AM EDT Injectable 1               IN

JECT  INJECT  SOLD: 

2020                                                      [facility]

 

        . UNIT          2020 12:00:00 AM EDT Injectable 1               AS

 DIRECTED AS DIRECTED SOLD:

2020                                                      [facility]

 

          300 mg              2020 12:00:00 AM EDT capsule   14           

       TAKE ONE CAPSULE BY MOUTH TWICE

A DAY FOR 7 DAYS          TAKE ONE CAPSULE BY MOUTH TWICE A DAY FOR 7 DAYS SOLD:

 

2020                                                      [facility]

 

        . UNIT          2020 12:00:00 AM EDT Injectable 1               AS

 DIRECTED AS DIRECTED SOLD:

2020                                                      [facility]

 

          20 mg               2020 12:00:00 AM EDT capsule,delayed release

(DR/EC) 180                 TAKE ONE

CAPSULE BY MOUTH TWICE A DAY TAKE ONE CAPSULE BY MOUTH TWICE A DAY SOLD: 

2020                                                      [facility]

 

          20 mg               2020 12:00:00 AM EDT capsule,delayed release

(DR/EC) 180                 TAKE ONE

CAPSULE BY MOUTH TWICE A DAY TAKE ONE CAPSULE BY MOUTH TWICE A DAY SOLD: 

01/10/2021                                                      [facility]

 

          20 mg               2020 12:00:00 AM EDT capsule,delayed release

(DR/EC) 180                 TAKE ONE

CAPSULE BY MOUTH TWICE A DAY TAKE ONE CAPSULE BY MOUTH TWICE A DAY SOLD: 

2020                                                      [facility]

 

          5-12.5 mg           2020 12:00:00 AM EDT tablet    90           

       TAKE 1 TABLET BY MOUTH ONCE 

DAILY      TAKE 1 TABLET BY MOUTH ONCE DAILY SOLD: 10/08/2020                   

               [facility]

 

          5-12.5 mg           2020 12:00:00 AM EDT tablet    90           

       TAKE 1 TABLET BY MOUTH ONCE 

DAILY      TAKE 1 TABLET BY MOUTH ONCE DAILY SOLD: 01/10/2021                   

               [facility]

 

          5-12.5 mg           2020 12:00:00 AM EDT tablet    90           

       TAKE 1 TABLET BY MOUTH ONCE 

DAILY      TAKE 1 TABLET BY MOUTH ONCE DAILY SOLD: 2020                   

               [facility]

 

          5-12.5 mg           2020 12:00:00 AM EDT tablet    90           

       TAKE 1 TABLET BY MOUTH ONCE 

DAILY      TAKE 1 TABLET BY MOUTH ONCE DAILY SOLD: 2020                   

               [facility]

 

          20 mg               2020 12:00:00 AM EDT capsule,delayed release

(DR/EC) 30                  TAKE ONE 

CAPSULE BY MOUTH EVERY MORNING AS DIRECTED TAKE ONE CAPSULE BY MOUTH EVERY 

MORNING AS DIRECTED SOLD: 2020                                        [facility]

 

          17.5-3.13-1.6 gram           03/10/2020 12:00:00 AM EDT recon soln 354

                 USE AS DIRECTED

BY PHYSICIAN USE AS DIRECTED BY PHYSICIAN SOLD: 2020                      

            [facility]

 

          1 gram              2020 12:00:00 AM EST tablet    360          

       TAKE ONE TABLET BY MOUTH UP TO 

FOUR TIMES A DAY BEFORE MEALS AND AT BEDTIME TAKE ONE TABLET BY MOUTH UP TO FOUR

TIMES A DAY BEFORE MEALS AND AT BEDTIME SOLD: 11/15/2020                        

                [facility]

 

          1 gram              2020 12:00:00 AM EST tablet    360          

       TAKE ONE TABLET BY MOUTH UP TO 

FOUR TIMES A DAY BEFORE MEALS AND AT BEDTIME TAKE ONE TABLET BY MOUTH UP TO FOUR

TIMES A DAY BEFORE MEALS AND AT BEDTIME SOLD: 2020                        

                [facility]

 

          1 gram              2020 12:00:00 AM EST tablet    360          

       TAKE ONE TABLET BY MOUTH UP TO 

FOUR TIMES A DAY BEFORE MEALS AND AT BEDTIME TAKE ONE TABLET BY MOUTH UP TO FOUR

TIMES A DAY BEFORE MEALS AND AT BEDTIME SOLD: 2020                        

                [facility]

 

          1 gram              2020 12:00:00 AM EST tablet    20           

       TAKE ONE TABLET BY MOUTH TWICE A

DAY        TAKE ONE TABLET BY MOUTH TWICE A DAY SOLD: 2020                

                  [facility]

 

          3-5 %               10/30/2019 12:00:00 AM EDT gel       46           

       APPLY TO ACNE ONCE DAILY AT BEDTIME,

WASH OFF IN THE MORNING                 APPLY TO ACNE ONCE DAILY AT BEDTIME, WAS

H OFF IN THE 

MORNING      SOLD: 2020                                        [facility]

s

 

          3-5 %               10/30/2019 12:00:00 AM EDT gel       46           

       APPLY TO ACNE ONCE DAILY AT BEDTIME,

WASH OFF IN THE MORNING                 APPLY TO ACNE ONCE DAILY AT BEDTIME, WAS

H OFF IN THE 

MORNING      SOLD: 2020                                        [facility]

s

 

          3-5 %               10/30/2019 12:00:00 AM EDT gel       46           

       APPLY TO [facility] ONCE DAILY AT BEDTIME,

WASH OFF IN THE MORNING                 APPLY TO [facility] ONCE DAILY AT BEDTIME, WAS

H OFF IN THE 

MORNING      SOLD: 2020                                        [facility]

s

 

          3-5 %               10/30/2019 12:00:00 AM EDT gel       46           

       APPLY TO AC[facility] ONCE DAILY AT BEDTIME,

WASH OFF IN THE MORNING                 APPLY TO [facility] ONCE DAILY AT BEDTIME, WAS

H OFF IN THE 

MORNING      SOLD: 2020                                        [facility]

s

 

          5-12.5 mg           10/25/2019 12:00:00 AM EDT tablet    90           

       TAKE ONE TABLET BY MOUTH 

EVERY DAY  TAKE ONE TABLET BY MOUTH EVERY DAY SOLD: 2020                  

                [facility]

 

          20 mg               10/25/2019 12:00:00 AM EDT tablet    90           

       TAKE ONE TABLET BY MOUTH EVERY 

DAY FOR GUM DISEASE       TAKE ONE TABLET BY MOUTH EVERY DAY FOR GUM DISEASE SOL

D: 

2020                                                      [facility]

 

                    silver sulfadiazine 10 MG/ML Topical Cream [SSD] SILVER SULF

ADIAZINE 10/25/2019 

12:00:00 AM EDT cream        50                        APPLY TO AFFECTED AREA(S)

 AS NEEDED APPLY TO AFFECTED

 AREA(S) AS NEEDED SOLD: 10/10/2020                                        [facility]

 

          20 mg               10/25/2019 12:00:00 AM EDT tablet    90           

       TAKE ONE TABLET BY MOUTH EVERY 

DAY FOR GUM DISEASE       TAKE ONE TABLET BY MOUTH EVERY DAY FOR GUM DISEASE SOL

D: 

2020                                                      [facility]



                                                                                
                                                                                
                                                                                
                                                                                
                                                                                
                  



Insurance Providers

          



             Payer name   Policy type / Coverage type Policy ID    Covered party

 ID Covered 

party's relationship to denis Policy Denis             Plan Information

 

          [facility]           [id number]           SP               

   [id number]

 

          R       O         [id number]           S                   [id number]

 

          [employer]                         -O/P           [id number]           18     

             [id number]

 

          [employer]  Commercial [id number]           Self                [id number]

[id number]

 

          [facility]       Commercial [id number]           Self                [id number]

 

          [facility]       HEA       [id number]           S                   [id number]

 

          [facility]           [id number]           SP               

   [id number]

 

          r       Commercial [id number]           Self                [id number]

 

          r       Commercial [id number]           Self                [id number]

 

          r       Commercial [id number]           Self                [id number]

 

          [employer]     Commercial [id number]           Self                [id number]

 

          [employer]                       -O/P           [id number]           18     

             [id number]

 

          NOT ASKED           UNAVAILABLE                               UNAVAILA

BLE

 

          [employer]     Commercial [id number]           Self                [id number]

 

          [facility]     Commercial                     Self                 



                                                                                
                                                                                
                                                                    



Problems, Conditions, and Diagnoses

          



           Code       Display Name Description Problem Type Effective Dates Data

 Source(s)

 

             54261325     Essential hypertension Essential hypertension Problem 

     02/10/2021 

12:00:00 AM EST                         [facility]

 

                [id number]       Gastroesophageal reflux disease Gastroesophageal

 reflux disease 

Problem                   02/10/2021 12:00:00 AM EST [facility]



                                                                                
                            



Surgeries/Procedures

          



             Procedure    Description  Date         Indications  Data Source(s)

 

             Mammogram                 2020 12:00:00 AM EDT              M

[facility]

 

                                        Document: 18 - Mammo Document:  - US 



                                                                                
        



Results

          



                    ID                  Date                Data Source

 

                    [id number]            10/22/2020 11:22:00 AM EDT [facility]









          Name      Value     Range     Interpretation Code Description Data Tamara

rce(s) Supporting 

Document(s)

 

           Bacteria identified in Urine by Culture Laboratory test result       

                           [facility] 

([facility]          

 

                                        FULL REPORT IN LAB NOTES ([facility] and [facility]

).

NO GROWTH



 









                    ID                  Date                Data Source

 

                    [id number]            10/22/2020 11:20:00 AM EDT [facility]









          Name      Value     Range     Interpretation Code Description Data Tamara

rce(s) Supporting 

Document(s)

 

          pH of Urine by Test strip 5                                       [facility]

[facility] ([facility]  

 

          Specific gravity of Urine 1.00                                    [facility]

[facility]  

 

                          Leukocytes [#/area] in Urine sediment by Microscopy hi

 power field Laboratory 

test result                                         [facility]  

 

           Appearance of Urine Laboratory test result                           

       [facility]                              

 

           Color of Urine Laboratory test result                                

  [facility]                                    

 

           Glucose [Presence] in Urine Laboratory test result                   

               [facility]                    

 

           Protein [Presence] in Urine by Test strip Laboratory test result     

                             [facility] 

[facility]          

 

           Ketones [Presence] in Urine by Test strip Laboratory test result     

                             [facility] 

([facility]          

 

           Bilirubin.total [Presence] in Urine by Test strip Laboratory test res

ult                                  

[facility]   

 

           Urobilinogen [Mass/volume] in Urine by Test strip Laboratory test res

ult                                  

[facility]   

 

           Hemoglobin [Presence] in Urine by Test strip Laboratory test result  

                                [facility]

 ([facility]         

 

           Nitrite [Presence] in Urine by Test strip Laboratory test result     

                             [facility] 

[facility]          









                    ID                  Date                Data Source

 

                    [id number]            2020 06:55:00 AM EDT [facility]









          Name      Value     Range     Interpretation Code Description Data Tamara

rce(s) Supporting 

Document(s)

 

           Thyrotropin [Units/volume] in Serum or Plasma 2.670 uIU/ML 0.358-3.74

0                       

[facility]   









                    ID                  Date                Data Source

 

                    [id number]            2020 06:55:00 AM EDT [facility]









          Name      Value     Range     Interpretation Code Description Data Tamara

rce(s) Supporting 

Document(s)

 

          Triglycerides Level 72 mg/dL                                [facility]  

 

          Cholesterol Level 179 mg/dL                               [facility]  

 

          Non-HDL-C 121 mg/dL                               [facility]  

 

          LDL Cholesterol 107 mg/dL                               [facility]  

 

          HDL Cholesterol 58 mg/dL                                [facility]  

 

          Cholesterol Risk Ratio 3.086                                   [facility] 

[facility]  









                    ID                  Date                Data Source

 

                    [id number]            2020 06:55:00 AM EDT [facility]









          Name      Value     Range     Interpretation Code Description Data Tamara

rce(s) Supporting 

Document(s)

 

           Creatinine For GFR 0.60 mg/dL 0.55-1.30                        [facility]

 [facility]                                    

 

          Blood Urea Nitrogen 16 mg/dL  7-18                          [facility]  

 

          Glucose, Fasting 97 mg/dL                          [facility]  

 

          Sodium Level 143 meq/L 136-145                       [facility]  

 

           Glomerular Filtration Rate Laboratory test result                    

              [facility]                    

 

                                        <content>Units are mL/min/1.73 

m2</content><br/><content></content><br/><content>Chronic Kidney Disease Staging
 per NKF:</content><br/><content></content><br/><content>Stage I & II   GFR >=60
       Normal to Mildly Decreased</content><br/><content>Stage III      GFR 30-
59      Moderately Decreased</content><br/><content>Stage IV       GFR 15-29    
  Severely Decreased</content><br/><content>Stage V        GFR <15        Very 
Little GFR Left</content><br/><content>ESRD           GFR <15 on 
RRT</content><br/><content></content> 

 

          Potassium Serum 3.6 meq/L 3.5-5.1                       [facility]  

 

          Carbon Dioxide Level 28 meq/L  21-32                         [facility]  

 

          Chloride Level 109 meq/L                         [facility]  

 

          Ast/Sgot  13 U/L    7-37                          [facility]  

 

          Anion Gap 6 meq/L   8-16                          [facility]  

 

          Calcium Level 8.7 mg/dL 8.8-10.2                      [facility]  

 

          Alt/SGPT  20 U/L    12-78                         [facility]  

 

          Bilirubin,Total 0.5 mg/dL 0.2-1.0                       [facility]  

 

          Alkaline Phosphatase 59 U/L                            [facility]  

 

          Total Protein 6.9 GM/DL 6.4-8.2                       [facility]  

 

          Albumin   3.7 GM/DL 3.2-5.2                       [facility]  

 

          Albumin/Globulin Ratio 1.2       1.2-2.2                       [facility] 

[facility]  









                    ID                  Date                Data Source

 

                    [id number]            2020 06:55:00 AM EDT [facility]









          Name      Value     Range     Interpretation Code Description Data Tamara

rce(s) Supporting 

Document(s)

 

          White Blood Count 5.3 10    4.0-10.0                      [facility]  

 

          Red Blood Count 4.06 10   4.00-5.40                     [facility]  

 

          Hematocrit 38.7 %    36.0-47.0                     [facility]  

 

           Mean Corpuscular Volume 95.3 fl    80.0-96.0                        M

[facility]                                    

 

          Hemoglobin 12.8 g/dL 12.0-15.5                     [facility]  

 

           Mean Corpuscular Hemoglobin 31.5 pg    27.0-33.0                     

   [facility]                              

 

           Mean Corpuscular HGB Conc 33.1 g/dL  32.0-36.5                       

 [facility]                              

 

           Red Cell Distribution Width 12.2 %     11.5-14.5                     

   [facility]                              

 

           Platelet Count, Automated 296 10     150-450                         

 [facility]                                    

 

          Neutrophils % 45.6 %    36.0-66.0                     [facility]  

 

          Mono %    7.2 %     0.0-5.0                       [facility]  

 

          Eos %     1.7 %     0.0-3.0                       [facility]  

 

          Lymph %   44.2 %    24.0-44.0                     [facility]  

 

          Immature Granulocyte % 0.4 %     0-3.0                         [facility] 

[facility]  

 

          Baso %    0.9 %     0.0-1.0                       [facility]  

 

          Nucleated Red Blood Cell % 0.0 %     0-0                           [facility] 

 

 

          Lymph #   2.3 10    1.5-5.0                       [facility]  

 

          Neutrophils # 2.4 10    1.5-8.5                       [facility]  

 

          Mono #    0.4 10    0.0-0.8                       [facility]  

 

          Baso #    0.1 10    0.0-0.2                       [facility]  

 

          Eos #     0.1 10    0.0-0.5                       [facility]  









                    ID                  Date                Data Source

 

                    [id number]        2020 11:16:00 AM EDT [facility] [address]

[place] [address]                

                                  [phone]  NAME                          
                    SEX    PT STATUS         ACCOUNT NUMBER  ATUL CHENEY   REG REF              [id number]    ORDERING
 PHYSICIAN                                LOCATION                 MEDICAL 
RECORD NO.  ESE GREENE                                MAMMO             
       [id number]    ATTENDING PHYSICIAN                               DATE OF 
BIRTH            DATE OF EXAM/TIME  ESE CRANDALL                               
      1955               06/19/20 / 1104    TYPE / EXAM  US Breast - 
Complete Bilat    REASON FOR EXAM  DENSE BREAST SCREENING  BILATERAL ABVS 3D 
SCREENING ULTRASOUND      COMPARISON: Screening mammogram from the same date    
  FINDINGS:      No disturbing-appearing cystic or solid mass identified.       
IMPRESSION:      No sonographic evidence of malignancy. Occasional benign type 
calcifications are seen on mammography.      OVERALL FINAL ASSESSMENT OF 
FINDINGS   BI-RADS 2 - Benign findings            Reported By Celio Whittington MD on 20       Signed By Celio Whittington MD on 20     
                                               
______________________________________________   _______  _______               
                                                                          Date  
      Time  CC:  Celio Whittington MD; ESE GREENE  Techn: BUSMI       
      Trans Dt/Tm:             Trans by: DT             Prt Dt/Tm:    :
 Total DLP =    0.00 mGy-cm     : Total Radiation Dose =    0.0000 mSv 
   Lifetime Dose: 0 mSv   









          Name      Value     Range     Interpretation Code Description Data Tamara

rce(s) Supporting 

Document(s)

 

                                                                       









                    ID                  Date                Data Source

 

                    [id number]        2020 11:09:00 AM EDT [facility] [address]

[place] [address]                

                                  [phone]  NAME                          
                    SEX    PT STATUS         ACCOUNT NUMBER  ATUL CHENEY   REG REF              [id number]    ORDERING
 PHYSICIAN                                LOCATION                 MEDICAL 
RECORD NO.  ESE LEWISO             
       [id number]    ATTENDING PHYSICIAN                               DATE OF 
BIRTH            DATE OF EXAM/TIME  ESE CRANDALL                               
      1955               06/19/20 / 1041    TYPE / EXAM  3D DIG MAMMO 
SCREEN BILAT    REASON FOR EXAM  SCREENING     LAST CLINICAL BREAST EXAM: 4 
weeks ago      FIVE YEAR RISK: 2.1%      LIFETIME RISK: 6.6%      FAMILY HISTORY
 OF BREAST CARCINOMA: None      COMPARISON: 2019 and November 10, 2015
      2D bilateral digital mammogram in the CC and MLO projections was performed
 with supplemental 3D tomosynthesis of both breasts.      FINDINGS:      
Craniocaudad and oblique lateral views of the breasts were obtained. The breasts
 are extremely dense, which lowers the sensitivity of mammography.      
Occasional benign type calcifications are seen bilaterally.      There is no 
dominant mass, suspicious   clustered microcalcification or architectural 
distortion.      IMPRESSION:      No mammographic evidence of malignancy. Yearly
 screening recommended.      OVERALL FINAL ASSESSMENT OF FINDINGS   BI-RADS 2 - 
Benign findings      OVERALL FINAL ASSESSMENT OF THE BREAST COMPOSITION   Breast
 Density Classification: D   Description: The breasts are extremely dense, which
 lowers the sensitivity of mammography.                   This mammogram was 
read with the assistance of M-Sling, an FDA-approved computer-aided detection 
system for mammography.         Reported By Celio Whittington MD on 20 
1109       Signed By Celio Whittington MD on 20 1113                     
                               ______________________________________________   
_______  _______                                                                
                         Date        Time  CC:  Celoi Whittington MD; ESE GREENE  Techn: JOSH             Trans Dt/Tm:             Trans by: DT     
        Prt Dt/Tm:    : Total DLP =    0.00 mGy-cm     : Total
 Radiation Dose =    0.0000 mSv    Lifetime Dose: 0 mSv   









          Name      Value     Range     Interpretation Code Description Data Tamara

rce(s) Supporting 

Document(s)

 

                                                                       









                    ID                  Date                Data Source

 

                    [id number]            2020 03:47:00 PM EST [facility]









          Name      Value     Range     Interpretation Code Description Data Tamara

rce(s) Supporting 

Document(s)

 

           Amylase [Enzymatic activity/volume] in Serum or Plasma 47 U/L     25-

115                           [facility] 

[facility]          

 

                    Lipoprotein lipase [Enzymatic activity/volume] in Serum or P

lasma 107 U/L             

                                                [facility]  









                    ID                  Date                Data Source

 

                    [id number]            2020 03:47:00 PM EST [facility]









          Name      Value     Range     Interpretation Code Description Data Tamara

e(s) Supporting 

Document(s)

 

          Blood Urea Nitrogen 11 mg/dL  7-18                          [facility]  

 

          Glucose, Fasting 93 mg/dL                          [facility]  

 

           Creatinine For GFR 0.58 mg/dL 0.55-1.30                        [facility]

 [facility]                                    

 

          Sodium Level 137 meq/L 136-145                       [facility]  

 

           Glomerular Filtration Rate Laboratory test result                    

              [facility]                    

 

                                        <content>Units are mL/min/1.73 

m2</content><br/><content></content><br/><content>Chronic Kidney Disease Staging
per NKF:</content><br/><content></content><br/><content>Stage I & II   GFR >=60 
     Normal to Mildly Decreased</content><br/><content>Stage III      GFR 30-59 
    Moderately Decreased</content><br/><content>Stage IV       GFR 15-29      
Severely Decreased</content><br/><content>Stage V        GFR <15        Very 
Little GFR Left</content><br/><content>ESRD           GFR <15 on 
RRT</content><br/><content></content> 

 

          Carbon Dioxide Level 30 meq/L  21-32                         [facility]  

 

          Potassium Serum 3.1 meq/L 3.5-5.1                       [facility]  

 

          Chloride Level 100 meq/L                         [facility]  

 

          Anion Gap 7 meq/L   8-16                          [facility]  

 

          Calcium Level 8.9 mg/dL 8.8-10.2                      [facility]  









                    ID                  Date                Data Source

 

                    [id number]            2020 03:47:00 PM EST [facility]









          Name      Value     Range     Interpretation Code Description Data Tamara

e(s) Supporting 

Document(s)

 

          Alt/SGPT  22 U/L    12-78                         [facility]  

 

          Alkaline Phosphatase 54 U/L                            [facility]  

 

          Ast/Sgot  14 U/L    7-37                          [facility]  

 

          Bilirubin,Total 0.6 mg/dL 0.2-1.0                       [facility]  

 

          Bilirubin,Direct 0.1 mg/dL 0.0-0.2                       [facility]  

 

          Total Protein 6.9 GM/DL 6.4-8.2                       [facility]  

 

           Albumin/Globulin Ratio 1.30       1.00-1.93                        [facility]

[facility]                                    

 

          Albumin   3.9 GM/DL 3.2-5.2                       [facility]  









                    ID                  Date                Data Source

 

                    [id number]            2020 03:47:00 PM EST [facility]









          Name      Value     Range     Interpretation Code Description Data [facility](s) Supporting 

Document(s)

 

           CPK Creatine Phosphokinase 100 U/L                             

  [facility]                                    

 

          CK-MB Value Mass 1.6 ng/mL                               [facility]  

 

          Troponin I 0.03 ng/mL                               [facility]  

 

                                        <content>Troponin I Reference Interval f

or [employer] [place] 

LOCI:</content><br/><content></content><br/><content>99th Percentile= 0.00-0.045
ng/ml</content><br/><content></content><br/><content>Risk 
Stratification:</content><br/><content><= 0.10 ng/ml   Decreased Risk for 
Adverse Clinical</content><br/><content>Events.</content><br/><content>0.10-1.50
ng/ml   Increased Risk for Adverse Clinical</content><br/><content>Events. 
Evaluation of additional</content><br/><content>criterion and/or repeat testing 
in 2-6</content><br/><content>hours is suggested to rule out 
myocardial</content><br/><content>damage.</content><br/><content>>= 1.50 ng/ml  
Indicative of Myocardial Injury.</content><br/><content></content> 

 

          MB/CK Relative Index 1.60                                    [facility]  

 

                                        <content>DIAGNOSIS CRITERIA</content><br

/><content>MMB ng/ml       Relative 

Index (RI)</content><br/><content>NON-AMI               < or = 5               
N/A</content><br/><content>GRAY ZONE              > 5                < or = 
4</content><br/><content>AMI                    > 5                   > 
4</content><br/><content></content> 









                    ID                  Date                Data Source

 

                    [id number]            2020 03:47:00 PM EST [facility]









          Name      Value     Range     Interpretation Code Description Data Tamara

rce(s) Supporting 

Document(s)

 

          White Blood Count 6.0 10    4.0-10.0                      [facility]  

 

          Red Blood Count 3.76 10   4.00-5.40                     [facility]  

 

           Mean Corpuscular Volume 93.6 fl    80.0-96.0                        M

[facility]                                    

 

          Hemoglobin 12.0 g/dL 12.0-15.5                     [facility]  

 

          Hematocrit 35.2 %    36.0-47.0                     [facility]  

 

           Mean Corpuscular Hemoglobin 31.9 pg    27.0-33.0                     

   [facility]                              

 

           Mean Corpuscular HGB Conc 34.1 g/dL  32.0-36.5                       

 [facility]                              

 

           Red Cell Distribution Width 11.8 %     11.5-14.5                     

   [facility]                              

 

          Neutrophils % 54.4 %    36.0-66.0                     [facility]  

 

           Platelet Count, Automated 308 10     150-450                         

 [facility]                                    

 

          Mono %    6.7 %     0.0-5.0                       [facility]  

 

          Eos %     0.5 %     0.0-3.0                       [facility]  

 

          Lymph %   37.2 %    24.0-44.0                     [facility]  

 

          Immature Granulocyte % 0.2 %     0-3.0                         [facility] 

[facility]  

 

          Baso %    1.0 %     0.0-1.0                       [facility]  

 

          Nucleated Red Blood Cell % 0.0 %     0-0                           [facility] 



 

          Neutrophils # 3.3 10    1.5-8.5                       [facility]  

 

          Mono #    0.4 10    0.0-0.8                       [facility]  

 

          Lymph #   2.2 10    1.5-5.0                       [facility]  

 

          Baso #    0.1 10    0.0-0.2                       [facility]  

 

          Eos #     0.0 10    0.0-0.5                       [facility]  









                    ID                  Date                Data Source

 

                    [id number]            2020 03:46:00 PM EST [facility]









          Name      Value     Range     Interpretation Code Description Data Tamara

rce(s) Supporting 

Document(s)

 

           Appearance, Urine RFX Laboratory test result                         

         [facility]                              

 

           Color, Urine RFX Laboratory test result                              

    [facility]                                    

 

           Specific Gravity Ur Auto RFX 1.003      1.002-1.035                  

     [facility]                              

 

          PH,Urine RFX 6.0 units 5.0-9.0                       [facility]  

 

           Ketone, Urine Auto RFX Laboratory test result                        

          [facility]                              

 

           Glucose, Urine (Ua) Auto RFX Laboratory test result                  

                [facility]                    

 

           Protein, Urine Auto RFX Laboratory test result                       

           [facility]                              

 

           Nitrite, Urine Auto RFX Laboratory test result                       

           [facility]                              

 

           Bilirubin, Urine Auto RFX Laboratory test result                     

             [facility]                    

 

           Urobilinogen, Urine Auto RFX 0.2 mg/dL  0.0-2.0                      

    [facility]                              

 

           Leukocyte Esterase Ur Auto RFX Laboratory test result                

                  [facility]                    

 

           Blood, Urine Blood RFX Laboratory test result                        

          [facility]                              

 

          RBC, Urine Auto RFX 4 /HPF    0-3                           [facility]  

 

          WBC, Urine Auto RFX 1 /HPF    0-3                           [facility]  

 

           Bacteria, Urine Auto RFX Laboratory test result                      

            [facility]                              

 

           Hyaline Cast, Urine Auto RFX 0 /LPF     0-1                          

    [facility]                                    

 

           Squam Epithelial Cell Ur Aurfx 0 /HPF     0-6                        

      [facility]                                    







                                        Procedure

 

                                          



                                                                                
                                                                                
                                                                  



Social History

          



           Code       Duration   Value      Status     Description Data Source(s

)

 

             Smoking      02/10/2021 12:00:00 AM EST Patient is a former smoker 

completed    Patient 

is a former smoker                      [facility]



                                                                                
                 



Vital Signs

          



                    ID                  Date                Data Source

 

                    UNK                                      









           Name       Value      Range      Interpretation Code Description Data

 Source(s)

 

           Body mass index (BMI) [Ratio] 21.5 kg/m2                       21.5 k

g/m2 [facility]

 

           Ideal body weight 120 [lb_av]                       120 [lb_av] [facility]

[facility]

 

           Oxygen saturation in Arterial blood by Pulse oximetry 98 %           

                  98 %       [facility] 

[facility]

 

           Body weight 125.38 [lb_av]                       125.38 [lb_av] [facility]

[facility]

 

           Body height 64 [in_i]                        64 [in_i]  [facility]

 

                                        5'4" 

 

           Respiratory rate 16 /min                          16 /min    [facility] (

[facility]

 

           Body temperature 97.3 [degF]                       97.3 [degF] [facility]

 [facility]

 

           Heart rate 81 /min                          81 /min    [facility]

 

           Diastolic blood pressure 79 mm[Hg]                        79 mm[Hg]  

[facility]

 

           Systolic blood pressure 128 mm[Hg]                       128 mm[Hg] M

[facility]

 

           Diastolic blood pressure 89 mm[Hg]                        89 mm[Hg]  

[facility]

 

           Systolic blood pressure 162 mm[Hg]                       162 mm[Hg] M

[facility]

 

           Body mass index (BMI) [Ratio] 21.9 kg/m2                       21.9 k

g/m2 [facility]

 

           Ideal body weight 120 [lb_av]                       120 [lb_av] [facility]

[facility]

 

           Body weight 127.38 [lb_av]                       127.38 [lb_av] [facility]

[facility]

 

           Body height 64 [in_i]                        64 [in_i]  [facility]

 

                                        5'4" 

 

           Respiratory rate 16 /min                          16 /min    [facility] (

[facility]

 

           Body temperature 97.5 [degF]                       97.5 [degF] [facility]

 [facility]

 

           Heart rate 73 /min                          73 /min    [facility]

 

           Diastolic blood pressure 79 mm[Hg]                        79 mm[Hg]  

[facility]

 

           Systolic blood pressure 140 mm[Hg]                       140 mm[Hg] M

[facility]

 

           Body mass index (BMI) [Ratio] 22.1 kg/m2                       22.1 k

g/m2 [facility]

 

           Ideal body weight 120 [lb_av]                       120 [lb_av] [facility]

[facility]

 

           Body weight 128.50 [lb_av]                       128.50 [lb_av] [facility]

[facility]

 

           Body height 64 [in_i]                        64 [in_i]  [facility]

 

                                        5'4" 

 

           Respiratory rate 15 /min                          15 /min    [facility]

[facility]

 

           Body temperature 97.5 [degF]                       97.5 [degF] [facility]

 [facility]

 

           Heart rate 63 /min                          63 /min    [facility]

 

           Diastolic blood pressure 63 mm[Hg]                        63 mm[Hg]  

[facility]

 

           Systolic blood pressure 136 mm[Hg]                       136 mm[Hg] M

[facility]

 

           Body mass index (BMI) [Ratio] 22.1 kg/m2                       22.1 k

g/m2 [facility]

 

           Ideal body weight 120 [lb_av]                       120 [lb_av] [facility]

[facility]

 

           Oxygen saturation in Arterial blood by Pulse oximetry 98 %           

                  98 %       [facility] 

[facility]

 

           Body weight 129.00 [lb_av]                       129.00 [lb_av] [facility]

[facility]

 

           Body height 64 [in_i]                        64 [in_i]  [facility]

 

                                        5'4" 

 

           Respiratory rate 20 /min                          20 /min    [facility]

[facility]

 

           Body temperature 97.9 [degF]                       97.9 [degF] [facility]

 [facility]

 

           Heart rate 88 /min                          88 /min    [facility]

 

           Diastolic blood pressure 70 mm[Hg]                        70 mm[Hg]  

[facility]

 

           Systolic blood pressure 130 mm[Hg]                       130 mm[Hg] M

[facility]

 

           Diastolic blood pressure 74 mm[Hg]                        74 mm[Hg]  

[facility]

 

           Systolic blood pressure 141 mm[Hg]                       141 mm[Hg] M

[facility]

 

           Body mass index (BMI) [Ratio] 21.5 kg/m2                       21.5 k

g/m2 [facility]

 

           Ideal body weight 120 [lb_av]                       120 [lb_av] [facility]

[facility]

 

           Oxygen saturation in Arterial blood by Pulse oximetry 98 %           

                  98 %       [facility] 

[facility]

 

           Body weight 125.38 [lb_av]                       125.38 [lb_av] [facility]

[facility]

 

           Body height 64 [in_i]                        64 [in_i]  [facility]

 

                                        5'4" 

 

           Respiratory rate 17 /min                          17 /min    [facility] (

[facility]

 

           Body temperature 98.4 [degF]                       98.4 [degF] [facility]

 [facility]

 

           Heart rate 77 /min                          77 /min    [facility]

 

           Diastolic blood pressure 75 mm[Hg]                        75 mm[Hg]  

[facility]

 

                                        ra 

 

           Systolic blood pressure 125 mm[Hg]                       125 mm[Hg] [facility]

 

                                        ra

## 2021-02-13 NOTE — CCD
Summarization Of Episode

                             Created on: 2021



SHRAVAN ATUL

External Reference #: [id number]

: 1955

Sex: Female



Demographics





                          Address                   [address] [place]  [address]

 

                          Home Phone                [phone]

 

                          Preferred Language        English

 

                          Marital Status            

 

                          Tenriism Affiliation     PRE WATKOR

 

                          Race                      

 

                          Ethnic Group              Not  or 





Author





                          Author                    [facility]

 

                          Organization              [facility]

 

                          Address                   Unknown

 

                          Phone                     Unavailable







Support





                Name            Relationship    Address         Phone

 

                RE              Next Of Kin     Unknown         Unavailable

 

                UE              Next Of Kin     Unknown         Unavailable

 

                    TANYA BARNES  Next Of Kin         [address]

[place]  [address]                     [phone]

 

                VANE LAUREN CHA Next Of Kin     Unknown         Unavailab

[place]     [place]  [address]                     [phone]

 

                    JOSE HORNER Next Of Kin         [address]

[place]  [address]                     [phone]

 

                SHAHEED HORNER Next Of Kin     Unknown         [phone]

 

                UN              Next Of Kin     Unknown         Unavailable

 

                    NONE, PATIENT PER   Next Of Kin         -

                                        -

                          -, -  -                   -

 

                    CATINA CHENEY    Next Of Kin         [address]

[place]  [address] [phone]







Care Team Providers





                    Care Team Member Name Role                Phone

 

                    Scaarono, M Ese PA Unavailable         Unavailable

 

                    Scordo, M Ese PA Unavailable         Unavailable

 

                    Scordo, M Ese PA Unavailable         Unavailable

 

                    Scordo, M Ese PA Unavailable         Unavailable

 

                    Scordo, M Ese PA Unavailable         Unavailable

 

                    Scordo, M Ese PA Unavailable         Unavailable

 

                    Scordo, M Ese PA Unavailable         Unavailable

 

                    Scordo, M Ese PA Unavailable         Unavailable

 

                    Scordo, M Ese PA Unavailable         Unavailable

 

                    Scordo, M Ese PA Unavailable         Unavailable

 

                    Scordo, M Ese PA Unavailable         Unavailable

 

                    Scordo, M Ese PA Unavailable         Unavailable

 

                    Scordo, M Ese PA Unavailable         Unavailable

 

                    Scordo, M Ese PA Unavailable         Unavailable

 

                    Scordo, M Ese PA Unavailable         Unavailable

 

                    Scordo, M Ese PA Unavailable         Unavailable

 

                    Scordo, M Ese PA Unavailable         Unavailable

 

                    Scordo, M Ese PA Unavailable         Unavailable

 

                    Scordo, M Ese PA Unavailable         Unavailable

 

                    Scordo, M Ese PA Unavailable         Unavailable

 

                    Scordo, M Ese PA Unavailable         Unavailable

 

                    Scordo, M Ese PA Unavailable         Unavailable

 

                    Scordo, M Ese PA Unavailable         Unavailable

 

                    Scordo, M Ese PA Unavailable         Unavailable

 

                    Scordo, M Ese PA Unavailable         Unavailable

 

                    Scordo, M Ese PA Unavailable         Unavailable

 

                    Scordo, M Ese PA Unavailable         Unavailable

 

                    Scordo, M Ese PA Unavailable         Unavailable

 

                    Scordo, M Ese PA Unavailable         Unavailable

 

                    Scordo, M Ese PA Unavailable         Unavailable

 

                    Scordo, M Ese PA Unavailable         Unavailable

 

                    Scordo, M Ese PA Unavailable         Unavailable

 

                    Scordo, M Ese PA Unavailable         Unavailable

 

                    Scordo, M Ese PA Unavailable         Unavailable

 

                    Scordo, M Ese PA Unavailable         Unavailable

 

                    Scordo, M Ese PA Unavailable         Unavailable

 

                    Scordo, M Ese PA Unavailable         Unavailable

 

                    Scordo, M Ese PA Unavailable         Unavailable

 

                    Scordo, M Ese PA Unavailable         Unavailable

 

                    Scordo, M Ese PA Unavailable         Unavailable

 

                    Scordo, M Ese PA Unavailable         Unavailable

 

                    Scordo, M Ese PA Unavailable         Unavailable

 

                    Pleskach,  Agatha FNP Unavailable         Unavailable

 

                    Pleskach,  Agatha FNP Unavailable         Unavailable

 

                    Pleskach,  Agatha FNP Unavailable         Unavailable

 

                    Pleskach,  Agatha FNP Unavailable         Unavailable

 

                    Pleskach,  Agatha FNP Unavailable         Unavailable

 

                    Pleskach,  Agatha FNP Unavailable         Unavailable

 

                    Pleskach,  Agatha FNP Unavailable         Unavailable

 

                    Pleskach,  Agatha FNP Unavailable         Unavailable

 

                    Pleskach,  Agatha FNP Unavailable         Unavailable

 

                    Pleskach,  Agatha FNP Unavailable         Unavailable

 

                    Pleskach,  Agatha FNP Unavailable         Unavailable

 

                    Pleskach,  Agatha FNP Unavailable         Unavailable

 

                    Pleskach,  Agatha FNP Unavailable         Unavailable

 

                    Pleskach,  Agatha FNP Unavailable         Unavailable

 

                    Pleskach,  Agatha FNP Unavailable         Unavailable

 

                    Pleskach,  Agatha FNP Unavailable         Unavailable

 

                    Pleskach,  Agatha FNP Unavailable         Unavailable

 

                    Pleskach,  Agatha FNP Unavailable         Unavailable

 

                    Pleskach,  Agatha FNP Unavailable         Unavailable

 

                    Pleskach,  Agatha FNP Unavailable         Unavailable

 

                    Pleskach,  Agatha FNP Unavailable         Unavailable

 

                    Pleskach,  Agatha FNP Unavailable         Unavailable

 

                    Pleskach,  Agatha FNP Unavailable         Unavailable

 

                    Pleskach,  Agatha FNP Unavailable         Unavailable

 

                    Pleskach,  Agatha FNP Unavailable         Unavailable

 

                    Pleskach,  Agatha FNP Unavailable         Unavailable

 

                    Pleskach,  Agatha FNP Unavailable         Unavailable

 

                    Pleskach,  Agatha FNP Unavailable         Unavailable

 

                    Pleskach,  Agatha FNP Unavailable         Unavailable

 

                    Pleskach,  Agatha FNP Unavailable         Unavailable

 

                    Scordo, M Ese PA Unavailable         Unavailable

 

                    Scordo, M Ese PA Unavailable         Unavailable

 

                    Scordo, M Ese PA Unavailable         Unavailable

 

                    Scordo, M Ese PA Unavailable         Unavailable

 

                    Scordo, M Ese PA Unavailable         Unavailable

 

                    Scordo, M Ese PA Unavailable         Unavailable

 

                    Scordo, M Ese PA Unavailable         Unavailable

 

                    Scordo, M Ese PA Unavailable         Unavailable

 

                    Scordo, M Ese PA Unavailable         Unavailable

 

                    Scordo, M Ese PA Unavailable         Unavailable

 

                    Scordo, M Ese PA Unavailable         Unavailable

 

                    Scordo, M Ese PA Unavailable         Unavailable

 

                    Scordo, M Ese PA Unavailable         Unavailable

 

                    Scordo, M Ese PA Unavailable         Unavailable

 

                    Scordo, M Ese PA Unavailable         Unavailable

 

                    Scordo, M Ese PA Unavailable         Unavailable

 

                    Scordo, M Ese PA Unavailable         Unavailable

 

                    Scordo, M Ese PA Unavailable         Unavailable

 

                    Scordo, M Ese PA Unavailable         Unavailable

 

                    Scordo, M Ese PA Unavailable         Unavailable

 

                    Scordo, M Ese PA Unavailable         Unavailable

 

                    Scordo, M Ese PA Unavailable         Unavailable

 

                    Scordo, M Ese PA Unavailable         Unavailable

 

                    Scordo, M Ese PA Unavailable         Unavailable

 

                    Scordo, M Ese PA Unavailable         Unavailable

 

                    Scordo, M Ese PA Unavailable         Unavailable

 

                    Scordo, M Ese PA Unavailable         Unavailable

 

                    Scordo, M Ese PA Unavailable         Unavailable

 

                    Scordo, M Ese PA Unavailable         Unavailable

 

                    Scordo, M Ese PA Unavailable         Unavailable

 

                    Scordo, M Ese PA Unavailable         Unavailable

 

                    Scordo, M Ese PA Unavailable         Unavailable

 

                    Scordo, M Ese PA Unavailable         Unavailable

 

                    Scordo, M Ese PA Unavailable         Unavailable

 

                    Scordo, M Ese PA Unavailable         Unavailable

 

                    Scordo, M Ese PA Unavailable         Unavailable

 

                    Scordo, M Ese PA Unavailable         Unavailable

 

                    Scordo, M Ese PA Unavailable         Unavailable

 

                    Scordo, M Ese PA Unavailable         Unavailable

 

                    Scordo, M Ese PA Unavailable         Unavailable

 

                    Scordo, M Ese PA Unavailable         Unavailable

 

                    Scordo, M Ese PA Unavailable         Unavailable



                                  



Re-disclosure Warning

          The records that you are about to access may contain information from 
federally-assisted alcohol or drug abuse programs. If such information is 
present, then the following federally mandated warning applies: This information
has been disclosed to you from records protected by federal confidentiality 
rules (42 CFR part 2). The federal rules prohibit you from making any further 
disclosure of this information unless further disclosure is expressly permitted 
by the written consent of the person to whom it pertains or as otherwise 
permitted by 42 CFR part 2. A general authorization for the release of medical 
or other information is NOT sufficient for this purpose. The Federal rules 
restrict any use of the information to criminally investigate or prosecute any 
alcohol or drug abuse patient.The records that you are about to access may 
contain highly sensitive health information, the redisclosure of which is 
protected by Article 27-F of the [facility] Public Health law. If you 
continue you may have access to information: Regarding HIV / AIDS; Provided by 
facilities licensed or operated by the [facility] Office of Mental Health; 
or Provided by the [facility] Office for People With Developmental 
Disabilities. If such information is present, then the following [place] 
mandated warning applies: This information has been disclosed to you from 
confidential records which are protected by state law. State law prohibits you 
from making any further disclosure of this information without the specific 
written consent of the person to whom it pertains, or as otherwise permitted by 
law. Any unauthorized further disclosure in violation of state law may result in
a fine or long term sentence or both. A general authorization for the release of 
medical or other information is NOT sufficient authorization for further disc
losure.                                                                         
    



Family History

          



             Family Member Name Family Member Gender Family Member Status Date o

f Status 

Description                             Data Source(s)

 

           Unknown    Unknown    Problem                          [facility]

 

           Unknown    Unknown    Problem                          [facility]

 

           Unknown    Female     Problem                          [facility]



                                                                                
                           



Encounters

          



           Encounter  Providers  Location   Date       Indications Data Source(s

)

 

             Outpatient   Attender: Agatha John [facility] Main [facility]  02/10/2021 0

9:00:00 AM EST  

                                        [facility]

 

             Outpatient   Attender: Agatha John [facility]  10/22/2020 0

9:45:00 AM EDT  

                                        [facility]

 

             Outpatient   Attender: Agatha John [facility] Main [facility]  10/06/2020 1

0:45:00 AM EDT  

                                        [facility]

 

           Outpatient Attender: Ese INTERIANO            2020 10:23:00 

AM EDT SCREEN     [facility]

 

                                        SCREEN 

 

           Outpatient Attender: Ese INTERIANO Main Office 2020 10:00:00

 AM EDT            

[facility]

 

             Outpatient   Attender: Ese INTERIANO              2020 01:

00:00 PM EDT SCREEN, DENSE

Z12.31                                  [facility]

 

                                        SCREEN, DENSE Z12.31 

 

           Outpatient                       2020 08:03:00 AM EST          

  [facility]

 

           Outpatient Attender: Ese INTERIANO Main Office 2020 09:15:00

 AM EST            

[facility]



                                                                                
                                                                             



Immunizations

          



             Vaccine      Date         Status       Description  Data Source(s)

 

                          INFLUENZA VIRUS VACCINE QUADRIVALENT 2020-21 (6 MOS AN

D UP) 2020 12:00:00 

AM EDT              completed                               [facility]

 

                          VARICELLA-ZOSTER VIRUS GLYCOPROTEIN E,REC/AS01B ADJUVA

NT/PF 2020 12:00:00 

AM EDT              completed                               [facility]

 

             pneumococcal polysaccharide PPV23 2020 07:24:00 AM EDT comple

florentino                 [facility] 

([facility]

 

                    PNEUMOCOCCAL 23-VALENT POLYSACCHARIDE VACCINE 2020 12:

00:00 AM EDT 

completed                                           [facility]



                                                                                
                                     



Medications

          



          Medication Brand Name Start Date Product Form Dose      Route     Admi

nistrative 

Instructions Pharmacy Instructions Status     Indications Reaction   Description

 Data 

Source(s)

 

                    levocetirizine dihydrochloride 5 MG Oral Tablet Levocetirizi

ne Dihydrochloride 

02/10/2021 12:00:00 AM EST               ORAL                 active            

          [facility]

 

        Azithromycin 250 MG Oral Tablet Azithromycin 02/10/2021 12:00:00 AM EST 

                                

             active                                              [facility]

 

          250 mg              02/10/2021 12:00:00 AM EST tablet    6            

       TAKE TWO TABLETS BY MOUTH AT ONCE

ON THE FIRST DAY THEN TAKE ONE DAILY THEREAFTER TAKE TWO TABLETS BY MOUTH AT 

ONCE ON THE FIRST DAY THEN TAKE ONE DAILY THEREAFTER SOLD: 02/10/2021           

                             

[facility]

 

          0.05 %              10/07/2020 12:00:00 AM EDT ointment  15           

       APPLY TWO TIMES A DAY TO RASH 

ON HANDS AS NEEDED        APPLY TWO TIMES A DAY TO RASH ON HANDS AS NEEDED SOLD:

 

10/08/2020                                                      [facility]

 

          20 mg               10/07/2020 12:00:00 AM EDT tablet    90           

       TAKE ONE TABLET BY MOUTH DAILY 

FOR GUM DISEASE     TAKE ONE TABLET BY MOUTH DAILY FOR GUM DISEASE SOLD: 10/08/2

020    

                                                            [facility]

 

          20 mg               10/07/2020 12:00:00 AM EDT tablet    90           

       TAKE ONE TABLET BY MOUTH DAILY 

FOR GUM DISEASE     TAKE ONE TABLET BY MOUTH DAILY FOR GUM DISEASE SOLD: 01/10/2

021    

                                                            [facility]

 

          3.5-10,000-1 mg/mL-unit/mL-%           10/06/2020 12:00:00 AM EDT solu

tion  10                  APPLY TO

EARS EVERYDAY AS NEEDED FOR ITCHING     APPLY TO EARS EVERYDAY AS NEEDED FOR ITC

SEUN

             SOLD: 10/08/2020                                        [facility]

s

 

          3-5 %               10/06/2020 12:00:00 AM EDT gel       46           

       APPLY TO ACNE AT BEDTIME, WASH OFF 

IN THE MORNING            APPLY TO ACNE AT BEDTIME, WASH OFF IN THE MORNING SOLD

: 

10/08/2020                                                      [facility]

 

          3-5 %               10/06/2020 12:00:00 AM EDT gel       46           

       APPLY TO ACNE AT BEDTIME, WASH OFF 

IN THE MORNING            APPLY TO ACNE AT BEDTIME, WASH OFF IN THE MORNING SOLD

: 

2020                                                      [facility]

 

          3-5 %               10/06/2020 12:00:00 AM EDT gel       46           

       APPLY TO ACNE AT BEDTIME, WASH OFF 

IN THE MORNING            APPLY TO ACNE AT BEDTIME, WASH OFF IN THE MORNING SOLD

: 

2021                                                      [facility]

 

                                        Hydrocortisone 10 MG/ML / Neomycin 3.5 M

G/ML / Polymyxin B 94726 UNT/ML [facility] 

Solution        Neomycin/Polymyxin/Hydrocortisone (Otic) 10/06/2020 12:00:00 AM 

EDT                  

                                active                          [facility]

 

        . UNIT          2020 12:00:00 AM EDT Injectable 1               IN

JECT  INJECT  SOLD: 

2020                                                      [facility]

 

        . UNIT          2020 12:00:00 AM EDT Injectable 1               AS

 DIRECTED AS DIRECTED SOLD:

2020                                                      [facility]

 

          300 mg              2020 12:00:00 AM EDT capsule   14           

       TAKE ONE CAPSULE BY MOUTH TWICE

A DAY FOR 7 DAYS          TAKE ONE CAPSULE BY MOUTH TWICE A DAY FOR 7 DAYS SOLD:

 

2020                                                      [facility]

 

        . UNIT          2020 12:00:00 AM EDT Injectable 1               AS

 DIRECTED AS DIRECTED SOLD:

2020                                                      [facility]

 

          20 mg               2020 12:00:00 AM EDT capsule,delayed release

(DR/EC) 180                 TAKE ONE

CAPSULE BY MOUTH TWICE A DAY TAKE ONE CAPSULE BY MOUTH TWICE A DAY SOLD: 

2020                                                      [facility]

 

          20 mg               2020 12:00:00 AM EDT capsule,delayed release

(DR/EC) 180                 TAKE ONE

CAPSULE BY MOUTH TWICE A DAY TAKE ONE CAPSULE BY MOUTH TWICE A DAY SOLD: 

01/10/2021                                                      [facility]

 

          20 mg               2020 12:00:00 AM EDT capsule,delayed release

(DR/EC) 180                 TAKE ONE

CAPSULE BY MOUTH TWICE A DAY TAKE ONE CAPSULE BY MOUTH TWICE A DAY SOLD: 

2020                                                      [facility]

 

          5-12.5 mg           2020 12:00:00 AM EDT tablet    90           

       TAKE 1 TABLET BY MOUTH ONCE 

DAILY      TAKE 1 TABLET BY MOUTH ONCE DAILY SOLD: 10/08/2020                   

               [facility]

 

          5-12.5 mg           2020 12:00:00 AM EDT tablet    90           

       TAKE 1 TABLET BY MOUTH ONCE 

DAILY      TAKE 1 TABLET BY MOUTH ONCE DAILY SOLD: 01/10/2021                   

               [facility]

 

          5-12.5 mg           2020 12:00:00 AM EDT tablet    90           

       TAKE 1 TABLET BY MOUTH ONCE 

DAILY      TAKE 1 TABLET BY MOUTH ONCE DAILY SOLD: 2020                   

               [facility]

 

          5-12.5 mg           2020 12:00:00 AM EDT tablet    90           

       TAKE 1 TABLET BY MOUTH ONCE 

DAILY      TAKE 1 TABLET BY MOUTH ONCE DAILY SOLD: 2020                   

               [facility]

 

          20 mg               2020 12:00:00 AM EDT capsule,delayed release

(DR/EC) 30                  TAKE ONE 

CAPSULE BY MOUTH EVERY MORNING AS DIRECTED TAKE ONE CAPSULE BY MOUTH EVERY 

MORNING AS DIRECTED SOLD: 2020                                        [facility]

 

          17.5-3.13-1.6 gram           03/10/2020 12:00:00 AM EDT recon soln 354

                 USE AS DIRECTED

BY PHYSICIAN USE AS DIRECTED BY PHYSICIAN SOLD: 2020                      

            [facility]

 

          1 gram              2020 12:00:00 AM EST tablet    360          

       TAKE ONE TABLET BY MOUTH UP TO 

FOUR TIMES A DAY BEFORE MEALS AND AT BEDTIME TAKE ONE TABLET BY MOUTH UP TO FOUR

TIMES A DAY BEFORE MEALS AND AT BEDTIME SOLD: 11/15/2020                        

                [facility]

 

          1 gram              2020 12:00:00 AM EST tablet    360          

       TAKE ONE TABLET BY MOUTH UP TO 

FOUR TIMES A DAY BEFORE MEALS AND AT BEDTIME TAKE ONE TABLET BY MOUTH UP TO FOUR

TIMES A DAY BEFORE MEALS AND AT BEDTIME SOLD: 2020                        

                [facility]

 

          1 gram              2020 12:00:00 AM EST tablet    360          

       TAKE ONE TABLET BY MOUTH UP TO 

FOUR TIMES A DAY BEFORE MEALS AND AT BEDTIME TAKE ONE TABLET BY MOUTH UP TO FOUR

TIMES A DAY BEFORE MEALS AND AT BEDTIME SOLD: 2020                        

                [facility]

 

          1 gram              2020 12:00:00 AM EST tablet    20           

       TAKE ONE TABLET BY MOUTH TWICE A

DAY        TAKE ONE TABLET BY MOUTH TWICE A DAY SOLD: 2020                

                  [facility]

 

          3-5 %               10/30/2019 12:00:00 AM EDT gel       46           

       APPLY TO ACNE ONCE DAILY AT BEDTIME,

WASH OFF IN THE MORNING                 APPLY TO ACNE ONCE DAILY AT BEDTIME, WAS

H OFF IN THE 

MORNING      SOLD: 2020                                        [facility]

s

 

          3-5 %               10/30/2019 12:00:00 AM EDT gel       46           

       APPLY TO ACNE ONCE DAILY AT BEDTIME,

WASH OFF IN THE MORNING                 APPLY TO ACNE ONCE DAILY AT BEDTIME, WAS

H OFF IN THE 

MORNING      SOLD: 2020                                        [facility]

s

 

          3-5 %               10/30/2019 12:00:00 AM EDT gel       46           

       APPLY TO [facility] ONCE DAILY AT BEDTIME,

WASH OFF IN THE MORNING                 APPLY TO [facility] ONCE DAILY AT BEDTIME, WAS

H OFF IN THE 

MORNING      SOLD: 2020                                        [facility]

s

 

          3-5 %               10/30/2019 12:00:00 AM EDT gel       46           

       APPLY TO AC[facility] ONCE DAILY AT BEDTIME,

WASH OFF IN THE MORNING                 APPLY TO [facility] ONCE DAILY AT BEDTIME, WAS

H OFF IN THE 

MORNING      SOLD: 2020                                        [facility]

s

 

          5-12.5 mg           10/25/2019 12:00:00 AM EDT tablet    90           

       TAKE ONE TABLET BY MOUTH 

EVERY DAY  TAKE ONE TABLET BY MOUTH EVERY DAY SOLD: 2020                  

                [facility]

 

          20 mg               10/25/2019 12:00:00 AM EDT tablet    90           

       TAKE ONE TABLET BY MOUTH EVERY 

DAY FOR GUM DISEASE       TAKE ONE TABLET BY MOUTH EVERY DAY FOR GUM DISEASE SOL

D: 

2020                                                      [facility]

 

                    silver sulfadiazine 10 MG/ML Topical Cream [SSD] SILVER SULF

ADIAZINE 10/25/2019 

12:00:00 AM EDT cream        50                        APPLY TO AFFECTED AREA(S)

 AS NEEDED APPLY TO AFFECTED

 AREA(S) AS NEEDED SOLD: 10/10/2020                                        [facility]

 

          20 mg               10/25/2019 12:00:00 AM EDT tablet    90           

       TAKE ONE TABLET BY MOUTH EVERY 

DAY FOR GUM DISEASE       TAKE ONE TABLET BY MOUTH EVERY DAY FOR GUM DISEASE SOL

D: 

2020                                                      [facility]



                                                                                
                                                                                
                                                                                
                                                                                
                                                                                
                  



Insurance Providers

          



             Payer name   Policy type / Coverage type Policy ID    Covered party

 ID Covered 

party's relationship to denis Policy Denis             Plan Information

 

          [facility]           [id number]           SP               

   [id number]

 

          R       O         [id number]           S                   [id number]

 

          [facility]                         -O/P           [id number]           [id number]     

             [id number]

 

          [employer]  Commercial [id number]           Self                [id number]

[id number]

 

          [facility]       Commercial [id number]           Self                [id number]

 

          [facility]       HEA       [id number]           S                   [id number]

 

          [facility]           [id number]           SP               

   [id number]

 

          r       Commercial [id number]           Self                [id number]

 

          r       Commercial [id number]           Self                [id number]

 

          r       Commercial [id number]           Self                [id number]

 

          [employer]     Commercial [id number]           Self                [id number]

 

          [employer]                       -O/P           [id number]           [id number]     

             [id number]

 

          NOT ASKED           UNAVAILABLE                               UNAVAILA

BLE

 

          [employer]     Commercial [id number]           Self                [id number]

 

          [facility]     Commercial                     Self                 



                                                                                
                                                                                
                                                                    



Problems, Conditions, and Diagnoses

          



           Code       Display Name Description Problem Type Effective Dates Data

 Source(s)

 

             82939959     Essential hypertension Essential hypertension Problem 

     02/10/2021 

12:00:00 AM EST                         [facility]

 

                [id number]       Gastroesophageal reflux disease Gastroesophageal

 reflux disease 

Problem                   02/10/2021 12:00:00 AM EST [facility]



                                                                                
                            



Surgeries/Procedures

          



             Procedure    Description  Date         Indications  Data Source(s)

 

             Mammogram                 2020 12:00:00 AM EDT              M

[facility]

 

                                        Document: 18 - Mammo Document:  - US 



                                                                                
        



Results

          



                    ID                  Date                Data Source

 

                    [id number]            10/22/2020 11:22:00 AM EDT [facility]









          Name      Value     Range     Interpretation Code Description Data Tamara

rce(s) Supporting 

Document(s)

 

           Bacteria identified in Urine by Culture Laboratory test result       

                           [facility] 

([facility]          

 

                                        FULL REPORT IN LAB NOTES ([facility] and [facility]

).

NO GROWTH



 









                    ID                  Date                Data Source

 

                    [id number]            10/22/2020 11:20:00 AM EDT [facility]









          Name      Value     Range     Interpretation Code Description Data Tamara

rce(s) Supporting 

Document(s)

 

          pH of Urine by Test strip 5                                       [facility]

[facility] ([facility]  

 

          Specific gravity of Urine 1.00                                    [facility]

[facility]  

 

                          Leukocytes [#/area] in Urine sediment by Microscopy hi

 power field Laboratory 

test result                                         [facility]  

 

           Appearance of Urine Laboratory test result                           

       [facility]                              

 

           Color of Urine Laboratory test result                                

  [facility]                                    

 

           Glucose [Presence] in Urine Laboratory test result                   

               [facility]                    

 

           Protein [Presence] in Urine by Test strip Laboratory test result     

                             [facility] 

[facility]          

 

           Ketones [Presence] in Urine by Test strip Laboratory test result     

                             [facility] 

[facility]          

 

           Bilirubin.total [Presence] in Urine by Test strip Laboratory test res

ult                                  

[facility]   

 

           Urobilinogen [Mass/volume] in Urine by Test strip Laboratory test res

ult                                  

[facility]   

 

           Hemoglobin [Presence] in Urine by Test strip Laboratory test result  

                                [facility]

 ([facility]         

 

           Nitrite [Presence] in Urine by Test strip Laboratory test result     

                             [facility] 

[facility]          









                    ID                  Date                Data Source

 

                    [id number]            2020 06:55:00 AM EDT [facility]









          Name      Value     Range     Interpretation Code Description Data Tamara

rce(s) Supporting 

Document(s)

 

           Thyrotropin [Units/volume] in Serum or Plasma 2.670 uIU/ML 0.358-3.74

0                       

[facility]   









                    ID                  Date                Data Source

 

                    [id number]            2020 06:55:00 AM EDT [facility]









          Name      Value     Range     Interpretation Code Description Data Tamara

rce(s) Supporting 

Document(s)

 

          Triglycerides Level 72 mg/dL                                [facility]  

 

          Cholesterol Level 179 mg/dL                               [facility]  

 

          Non-HDL-C 121 mg/dL                               [facility]  

 

          LDL Cholesterol 107 mg/dL                               [facility]  

 

          HDL Cholesterol 58 mg/dL                                [facility]  

 

          Cholesterol Risk Ratio 3.086                                   [facility] 

[facility]  









                    ID                  Date                Data Source

 

                    [id number]            2020 06:55:00 AM EDT [facility]









          Name      Value     Range     Interpretation Code Description Data Tamara

rce(s) Supporting 

Document(s)

 

           Creatinine For GFR 0.60 mg/dL 0.55-1.30                        [facility]

 [facility]                                    

 

          Blood Urea Nitrogen 16 mg/dL  7-18                          [facility]  

 

          Glucose, Fasting 97 mg/dL                          [facility]  

 

          Sodium Level 143 meq/L 136-145                       [facility]  

 

           Glomerular Filtration Rate Laboratory test result                    

              [facility]                    

 

                                        <content>Units are mL/min/1.73 

m2</content><br/><content></content><br/><content>Chronic Kidney Disease Staging
 per NKF:</content><br/><content></content><br/><content>Stage I & II   GFR >=60
       Normal to Mildly Decreased</content><br/><content>Stage III      GFR 30-
59      Moderately Decreased</content><br/><content>Stage IV       GFR 15-29    
  Severely Decreased</content><br/><content>Stage V        GFR <15        Very 
Little GFR Left</content><br/><content>ESRD           GFR <15 on 
RRT</content><br/><content></content> 

 

          Potassium Serum 3.6 meq/L 3.5-5.1                       [facility]  

 

          Carbon Dioxide Level 28 meq/L  21-32                         [facility]  

 

          Chloride Level 109 meq/L                         [facility]  

 

          Ast/Sgot  13 U/L    7-37                          [facility]  

 

          Anion Gap 6 meq/L   8-16                          [facility]  

 

          Calcium Level 8.7 mg/dL 8.8-10.2                      [facility]  

 

          Alt/SGPT  20 U/L    12-78                         [facility]  

 

          Bilirubin,Total 0.5 mg/dL 0.2-1.0                       [facility]  

 

          Alkaline Phosphatase 59 U/L                            [facility]  

 

          Total Protein 6.9 GM/DL 6.4-8.2                       [facility]  

 

          Albumin   3.7 GM/DL 3.2-5.2                       [facility]  

 

          Albumin/Globulin Ratio 1.2       1.2-2.2                       [facility] 

[facility]  









                    ID                  Date                Data Source

 

                    [id number]            2020 06:55:00 AM EDT [facility]









          Name      Value     Range     Interpretation Code Description Data Tamara

rce(s) Supporting 

Document(s)

 

          White Blood Count 5.3 10    4.0-10.0                      [facility]  

 

          Red Blood Count 4.06 10   4.00-5.40                     [facility]  

 

          Hematocrit 38.7 %    36.0-47.0                     [facility]  

 

           Mean Corpuscular Volume 95.3 fl    80.0-96.0                        M

[facility]                                    

 

          Hemoglobin 12.8 g/dL 12.0-15.5                     [facility]  

 

           Mean Corpuscular Hemoglobin 31.5 pg    27.0-33.0                     

   [facility]                              

 

           Mean Corpuscular HGB Conc 33.1 g/dL  32.0-36.5                       

 [facility]                              

 

           Red Cell Distribution Width 12.2 %     11.5-14.5                     

   [facility]                              

 

           Platelet Count, Automated 296 10     150-450                         

 [facility]                                    

 

          Neutrophils % 45.6 %    36.0-66.0                     [facility]  

 

          Mono %    7.2 %     0.0-5.0                       [facility]  

 

          Eos %     1.7 %     0.0-3.0                       [facility]  

 

          Lymph %   44.2 %    24.0-44.0                     [facility]  

 

          Immature Granulocyte % 0.4 %     0-3.0                         [facility] 

[facility]  

 

          Baso %    0.9 %     0.0-1.0                       [facility]  

 

          Nucleated Red Blood Cell % 0.0 %     0-0                           [facility] 

 

 

          Lymph #   2.3 10    1.5-5.0                       [facility]  

 

          Neutrophils # 2.4 10    1.5-8.5                       [facility]  

 

          Mono #    0.4 10    0.0-0.8                       [facility]  

 

          Baso #    0.1 10    0.0-0.2                       [facility]  

 

          Eos #     0.1 10    0.0-0.5                       [facility]  









                    ID                  Date                Data Source

 

                    [id number]        2020 11:16:00 AM EDT [facility] [address]

[place] [address]                

                                  [phone]  NAME                          
                    SEX    PT STATUS         ACCOUNT NUMBER  ATUL CHENEY   REG REF              [id number]    ORDERING
 PHYSICIAN                                LOCATION                 MEDICAL 
RECORD NO.  ESE GREENE                                MAMMO             
       [id number]    ATTENDING PHYSICIAN                               DATE OF 
BIRTH            DATE OF EXAM/TIME  ESE CRANDALL                               
      1955               06/19/20 / 1104    TYPE / EXAM  US Breast - 
Complete Bilat    REASON FOR EXAM  DENSE BREAST SCREENING  BILATERAL ABVS 3D 
SCREENING ULTRASOUND      COMPARISON: Screening mammogram from the same date    
  FINDINGS:      No disturbing-appearing cystic or solid mass identified.       
IMPRESSION:      No sonographic evidence of malignancy. Occasional benign type 
calcifications are seen on mammography.      OVERALL FINAL ASSESSMENT OF 
FINDINGS   BI-RADS 2 - Benign findings            Reported By Celio Whittington MD on 20       Signed By Celio Whittington MD on 20     
                                               
______________________________________________   _______  _______               
                                                                          Date  
      Time  CC:  Celio Whittington MD; ESE GREENE  Techn: BUSMI       
      Trans Dt/Tm:             Trans by: DT             Prt Dt/Tm:    :
 Total DLP =    0.00 mGy-cm     : Total Radiation Dose =    0.0000 mSv 
   Lifetime Dose: 0 mSv   









          Name      Value     Range     Interpretation Code Description Data Tamara

rce(s) Supporting 

Document(s)

 

                                                                       









                    ID                  Date                Data Source

 

                    [id number]        2020 11:09:00 AM EDT [facility] [address]

[place] [address]                

                                  [phone]  NAME                          
                    SEX    PT STATUS         ACCOUNT NUMBER  ATUL CHEENY   REG REF              [id number]    ORDERING
 PHYSICIAN                                LOCATION                 MEDICAL 
RECORD NO.  ESE LEWISO             
       [id number]    ATTENDING PHYSICIAN                               DATE OF 
BIRTH            DATE OF EXAM/TIME  ESE CRANDALL                               
      1955               06/19/20 / 1041    TYPE / EXAM  3D DIG MAMMO 
SCREEN BILAT    REASON FOR EXAM  SCREENING     LAST CLINICAL BREAST EXAM: 4 
weeks ago      FIVE YEAR RISK: 2.1%      LIFETIME RISK: 6.6%      FAMILY HISTORY
 OF BREAST CARCINOMA: None      COMPARISON: 2019 and November 10, 2015
      2D bilateral digital mammogram in the CC and MLO projections was performed
 with supplemental 3D tomosynthesis of both breasts.      FINDINGS:      
Craniocaudad and oblique lateral views of the breasts were obtained. The breasts
 are extremely dense, which lowers the sensitivity of mammography.      
Occasional benign type calcifications are seen bilaterally.      There is no 
dominant mass, suspicious   clustered microcalcification or architectural 
distortion.      IMPRESSION:      No mammographic evidence of malignancy. Yearly
 screening recommended.      OVERALL FINAL ASSESSMENT OF FINDINGS   BI-RADS 2 - 
Benign findings      OVERALL FINAL ASSESSMENT OF THE BREAST COMPOSITION   Breast
 Density Classification: D   Description: The breasts are extremely dense, which
 lowers the sensitivity of mammography.                   This mammogram was 
read with the assistance of M-doggyloot, an FDA-approved computer-aided detection 
system for mammography.         Reported By Celio Whittington MD on 20 
1109       Signed By Celio Whittington MD on 20 1113                     
                               ______________________________________________   
_______  _______                                                                
                         Date        Time  CC:  Celio Whittington MD; ESE GREENE  Techn: JOSH             Trans Dt/Tm:             Trans by: DT     
        Prt Dt/Tm:    : Total DLP =    0.00 mGy-cm     : Total
 Radiation Dose =    0.0000 mSv    Lifetime Dose: 0 mSv   









          Name      Value     Range     Interpretation Code Description Data Tamara

rce(s) Supporting 

Document(s)

 

                                                                       









                    ID                  Date                Data Source

 

                    [id number]            2020 03:47:00 PM EST [facility]









          Name      Value     Range     Interpretation Code Description Data Tamraa

rce(s) Supporting 

Document(s)

 

           Amylase [Enzymatic activity/volume] in Serum or Plasma 47 U/L     25-

115                           [facility] 

[facility]          

 

                    Lipoprotein lipase [Enzymatic activity/volume] in Serum or P

lasma 107 U/L             

                                                [facility]  









                    ID                  Date                Data Source

 

                    [id number]            2020 03:47:00 PM EST [facility]









          Name      Value     Range     Interpretation Code Description Data Tamara

e(s) Supporting 

Document(s)

 

          Blood Urea Nitrogen 11 mg/dL  7-18                          [facility]  

 

          Glucose, Fasting 93 mg/dL                          [facility]  

 

           Creatinine For GFR 0.58 mg/dL 0.55-1.30                        [facility]

 [facility]                                    

 

          Sodium Level 137 meq/L 136-145                       [facility]  

 

           Glomerular Filtration Rate Laboratory test result                    

              [facility]                    

 

                                        <content>Units are mL/min/1.73 

m2</content><br/><content></content><br/><content>Chronic Kidney Disease Staging
per NKF:</content><br/><content></content><br/><content>Stage I & II   GFR >=60 
     Normal to Mildly Decreased</content><br/><content>Stage III      GFR 30-59 
    Moderately Decreased</content><br/><content>Stage IV       GFR 15-29      
Severely Decreased</content><br/><content>Stage V        GFR <15        Very 
Little GFR Left</content><br/><content>ESRD           GFR <15 on 
RRT</content><br/><content></content> 

 

          Carbon Dioxide Level 30 meq/L  21-32                         [facility]  

 

          Potassium Serum 3.1 meq/L 3.5-5.1                       [facility]  

 

          Chloride Level 100 meq/L                         [facility]  

 

          Anion Gap 7 meq/L   8-16                          [facility]  

 

          Calcium Level 8.9 mg/dL 8.8-10.2                      [facility]  









                    ID                  Date                Data Source

 

                    [id number]            2020 03:47:00 PM EST [facility]









          Name      Value     Range     Interpretation Code Description Data Tamara

e(s) Supporting 

Document(s)

 

          Alt/SGPT  22 U/L    12-78                         [facility]  

 

          Alkaline Phosphatase 54 U/L                            [facility]  

 

          Ast/Sgot  14 U/L    7-37                          [facility]  

 

          Bilirubin,Total 0.6 mg/dL 0.2-1.0                       [facility]  

 

          Bilirubin,Direct 0.1 mg/dL 0.0-0.2                       [facility]  

 

          Total Protein 6.9 GM/DL 6.4-8.2                       [facility]  

 

           Albumin/Globulin Ratio 1.30       1.00-1.93                        [facility]

[facility]                                    

 

          Albumin   3.9 GM/DL 3.2-5.2                       [facility]  









                    ID                  Date                Data Source

 

                    [id number]            2020 03:47:00 PM EST [facility]









          Name      Value     Range     Interpretation Code Description Data [facility](s) Supporting 

Document(s)

 

           CPK Creatine Phosphokinase 100 U/L                             

  [facility]                                    

 

          CK-MB Value Mass 1.6 ng/mL                               [facility]  

 

          Troponin I 0.03 ng/mL                               [facility]  

 

                                        <content>Troponin I Reference Interval f

or Siemens Zimmerman 

LOCI:</content><br/><content></content><br/><content>99th Percentile= 0.00-0.045
ng/ml</content><br/><content></content><br/><content>Risk 
Stratification:</content><br/><content><= 0.10 ng/ml   Decreased Risk for 
Adverse Clinical</content><br/><content>Events.</content><br/><content>0.10-1.50
ng/ml   Increased Risk for Adverse Clinical</content><br/><content>Events. 
Evaluation of additional</content><br/><content>criterion and/or repeat testing 
in 2-6</content><br/><content>hours is suggested to rule out 
myocardial</content><br/><content>damage.</content><br/><content>>= 1.50 ng/ml  
Indicative of Myocardial Injury.</content><br/><content></content> 

 

          MB/CK Relative Index 1.60                                    [facility]  

 

                                        <content>DIAGNOSIS CRITERIA</content><br

/><content>MMB ng/ml       Relative 

Index (RI)</content><br/><content>NON-AMI               < or = 5               
N/A</content><br/><content>GRAY ZONE              > 5                < or = 
4</content><br/><content>AMI                    > 5                   > 
4</content><br/><content></content> 









                    ID                  Date                Data Source

 

                    [id number]            2020 03:47:00 PM EST [facility]









          Name      Value     Range     Interpretation Code Description Data Tamara

rce(s) Supporting 

Document(s)

 

          White Blood Count 6.0 10    4.0-10.0                      [facility]  

 

          Red Blood Count 3.76 10   4.00-5.40                     [facility]  

 

           Mean Corpuscular Volume 93.6 fl    80.0-96.0                        M

[facility]                                    

 

          Hemoglobin 12.0 g/dL 12.0-15.5                     [facility]  

 

          Hematocrit 35.2 %    36.0-47.0                     [facility]  

 

           Mean Corpuscular Hemoglobin 31.9 pg    27.0-33.0                     

   [facility]                              

 

           Mean Corpuscular HGB Conc 34.1 g/dL  32.0-36.5                       

 [facility]                              

 

           Red Cell Distribution Width 11.8 %     11.5-14.5                     

   [facility]                              

 

          Neutrophils % 54.4 %    36.0-66.0                     [facility]  

 

           Platelet Count, Automated 308 10     150-450                         

 [facility]                                    

 

          Mono %    6.7 %     0.0-5.0                       [facility]  

 

          Eos %     0.5 %     0.0-3.0                       [facility]  

 

          Lymph %   37.2 %    24.0-44.0                     [facility]  

 

          Immature Granulocyte % 0.2 %     0-3.0                         [facility] 

[facility]  

 

          Baso %    1.0 %     0.0-1.0                       [facility]  

 

          Nucleated Red Blood Cell % 0.0 %     0-0                           [facility] 



 

          Neutrophils # 3.3 10    1.5-8.5                       [facility]  

 

          Mono #    0.4 10    0.0-0.8                       [facility]  

 

          Lymph #   2.2 10    1.5-5.0                       [facility]  

 

          Baso #    0.1 10    0.0-0.2                       [facility]  

 

          Eos #     0.0 10    0.0-0.5                       [facility]  









                    ID                  Date                Data Source

 

                    [id number]            2020 03:46:00 PM EST [facility]









          Name      Value     Range     Interpretation Code Description Data Tamara

rce(s) Supporting 

Document(s)

 

           Appearance, Urine RFX Laboratory test result                         

         [facility]                              

 

           Color, Urine RFX Laboratory test result                              

    [facility]                                    

 

           Specific Gravity Ur Auto RFX 1.003      1.002-1.035                  

     [facility]                              

 

          PH,Urine RFX 6.0 units 5.0-9.0                       [facility]  

 

           Ketone, Urine Auto RFX Laboratory test result                        

          [facility]                              

 

           Glucose, Urine (Ua) Auto RFX Laboratory test result                  

                [facility]                    

 

           Protein, Urine Auto RFX Laboratory test result                       

           [facility]                              

 

           Nitrite, Urine Auto RFX Laboratory test result                       

           [facility]                              

 

           Bilirubin, Urine Auto RFX Laboratory test result                     

             [facility]                    

 

           Urobilinogen, Urine Auto RFX 0.2 mg/dL  0.0-2.0                      

    [facility]                              

 

           Leukocyte Esterase Ur Auto RFX Laboratory test result                

                  [facility]                    

 

           Blood, Urine Blood RFX Laboratory test result                        

          [facility]                              

 

          RBC, Urine Auto RFX 4 /HPF    0-3                           [facility]  

 

          WBC, Urine Auto RFX 1 /HPF    0-3                           [facility]  

 

           Bacteria, Urine Auto RFX Laboratory test result                      

            [facility]                              

 

           Hyaline Cast, Urine Auto RFX 0 /LPF     0-1                          

    [facility]                                    

 

           Squam Epithelial Cell Ur Aurfx 0 /HPF     0-6                        

      [facility]                                    







                                        Procedure

 

                                          



                                                                                
                                                                                
                                                                  



Social History

          



           Code       Duration   Value      Status     Description Data Source(s

)

 

             Smoking      02/10/2021 12:00:00 AM EST Patient is a former smoker 

completed    Patient 

is a former smoker                      [facility]



                                                                                
                 



Vital Signs

          



                    ID                  Date                Data Source

 

                    UNK                                      









           Name       Value      Range      Interpretation Code Description Data

 Source(s)

 

           Body mass index (BMI) [Ratio] 21.5 kg/m2                       21.5 k

g/m2 [facility]

 

           Ideal body weight 120 [lb_av]                       120 [lb_av] [facility]

[facility]

 

           Oxygen saturation in Arterial blood by Pulse oximetry 98 %           

                  98 %       [facility] 

[facility]

 

           Body weight 125.38 [lb_av]                       125.38 [lb_av] [facility]

[facility]

 

           Body height 64 [in_i]                        64 [in_i]  [facility]

 

                                        5'4" 

 

           Respiratory rate 16 /min                          16 /min    [facility] (

[facility]

 

           Body temperature 97.3 [degF]                       97.3 [degF] [facility]

 [facility]

 

           Heart rate 81 /min                          81 /min    [facility]

 

           Diastolic blood pressure 79 mm[Hg]                        79 mm[Hg]  

[facility]

 

           Systolic blood pressure 128 mm[Hg]                       128 mm[Hg] M

[facility]

 

           Diastolic blood pressure 89 mm[Hg]                        89 mm[Hg]  

[facility]

 

           Systolic blood pressure 162 mm[Hg]                       162 mm[Hg] M

[facility]

 

           Body mass index (BMI) [Ratio] 21.9 kg/m2                       21.9 k

g/m2 [facility]

 

           Ideal body weight 120 [lb_av]                       120 [lb_av] [facility]

[facility]

 

           Body weight 127.38 [lb_av]                       127.38 [lb_av] [facility]

[facility]

 

           Body height 64 [in_i]                        64 [in_i]  [facility]

 

                                        5'4" 

 

           Respiratory rate 16 /min                          16 /min    [facility] (

[facility]

 

           Body temperature 97.5 [degF]                       97.5 [degF] [facility]

 [facility]

 

           Heart rate 73 /min                          73 /min    [facility]

 

           Diastolic blood pressure 79 mm[Hg]                        79 mm[Hg]  

[facility]

 

           Systolic blood pressure 140 mm[Hg]                       140 mm[Hg] M

[facility]

 

           Body mass index (BMI) [Ratio] 22.1 kg/m2                       22.1 k

g/m2 [facility]

 

           Ideal body weight 120 [lb_av]                       120 [lb_av] [facility]

[facility]

 

           Body weight 128.50 [lb_av]                       128.50 [lb_av] [facility]

[facility]

 

           Body height 64 [in_i]                        64 [in_i]  [facility]

 

                                        5'4" 

 

           Respiratory rate 15 /min                          15 /min    [facility]

[facility]

 

           Body temperature 97.5 [degF]                       97.5 [degF] [facility]

 [facility]

 

           Heart rate 63 /min                          63 /min    [facility]

 

           Diastolic blood pressure 63 mm[Hg]                        63 mm[Hg]  

[facility]

 

           Systolic blood pressure 136 mm[Hg]                       136 mm[Hg] M

[facility]

 

           Body mass index (BMI) [Ratio] 22.1 kg/m2                       22.1 k

g/m2 [facility]

 

           Ideal body weight 120 [lb_av]                       120 [lb_av] [facility]

[facility]

 

           Oxygen saturation in Arterial blood by Pulse oximetry 98 %           

                  98 %       [facility] 

[facility]

 

           Body weight 129.00 [lb_av]                       129.00 [lb_av] [facility]

[facility]

 

           Body height 64 [in_i]                        64 [in_i]  [facility]

 

                                        5'4" 

 

           Respiratory rate 20 /min                          20 /min    [facility]

[facility]

 

           Body temperature 97.9 [degF]                       97.9 [degF] [facility]

 [facility]

 

           Heart rate 88 /min                          88 /min    [facility]

 

           Diastolic blood pressure 70 mm[Hg]                        70 mm[Hg]  

[facility]

 

           Systolic blood pressure 130 mm[Hg]                       130 mm[Hg] M

[facility]

 

           Diastolic blood pressure 74 mm[Hg]                        74 mm[Hg]  

[facility]

 

           Systolic blood pressure 141 mm[Hg]                       141 mm[Hg] M

[facility]

 

           Body mass index (BMI) [Ratio] 21.5 kg/m2                       21.5 k

g/m2 [facility]

 

           Ideal body weight 120 [lb_av]                       120 [lb_av] [facility]

[facility]

 

           Oxygen saturation in Arterial blood by Pulse oximetry 98 %           

                  98 %       [facility] 

[facility]

 

           Body weight 125.38 [lb_av]                       125.38 [lb_av] [facility]

[facility]

 

           Body height 64 [in_i]                        64 [in_i]  [facility]

 

                                        5'4" 

 

           Respiratory rate 17 /min                          17 /min    [facility] (

[facility]

 

           Body temperature 98.4 [degF]                       98.4 [degF] [facility]

 [facility]

 

           Heart rate 77 /min                          77 /min    [facility]

 

           Diastolic blood pressure 75 mm[Hg]                        75 mm[Hg]  

[facility]

 

                                        ra 

 

           Systolic blood pressure 125 mm[Hg]                       125 mm[Hg] [facility]

 

                                        ra

## 2021-02-13 NOTE — CCD
Continuity of Care Document (CCD)

                             Created on: 02/10/2021



ParkerCesar cabrera Ermelinda

External Reference #: [id number]

: 1955

Sex: Female



Demographics





                          Address                   [address] [place] [address] [place]  [address]

 

                          Home Phone                [phone]

 

                          Preferred Language        Unknown

 

                          Marital Status            Unknown

 

                          Yarsani Affiliation     Judaism

 

                          Race                      Unknown

 

                          Ethnic Group              Declined to Specify/Unknown





Author





                          Author                    Cesar JOHN

 

                          Organization              Unknown

 

                          Address                   [address]

[place]  [address]



 

                          Phone                     [phone]







Problems





                    Active Problems     Provider            Date

 

                    Gastroesophageal reflux disease Agatha John FNP Onset: 

02/10/2021

 

                    Essential hypertension Agatha John FNP Onset: 02/10/202

1







Social History





                Type            Date            Description     Comments

 

                Birth Sex                       Unknown          

 

                Tobacco Use     Start: Unknown  Never Used Smokeless Tobacco  

 

                ETOH Use                        Denies alcohol use  

 

                Recreational Drug Use                 Denies Drug Use  

 

                Tobacco Use     Start: Unknown End: Unknown Patient is a former 

smoker prior 1/2 ppd

x 7-8 years. Quit at age 32 

 

                Smoking Status  Reviewed: 02/10/21 Patient is a former smoker pr

ior 1/2 ppd x 7-8

years. Quit at age 32 

 

                Exercise Type/Frequency                 Exercises regularly  

 

                Sun Exposure                    Use less than 15 SPF  

 

                Seat Belt/Car Seat                 Always uses seat belt  

 

                Smoke Alarms                    Yes              

 

                Smoke Alarms                    Carbon Monoxide Detector: Yes  







Allergies, Adverse Reactions, Alerts





             Active Allergies Reaction     Severity     Comments     Date

 

             Aspirin      makes her bleed                           10/06/2016







Medications





           Active Medications SIG        Qnty       Indications Ordering Provide

r Date

 

                          Azithromycin                     250mg Tablets        

           2 tabs today 

then one tab daily for 4 days 6tabs           H66.93          Agatha John F

NP 02/10/2021

 

                                        Levocetirizine Dihydrochloride          

           5mg Tablets                  

             1 tab by mouth every evening 90tabs       H66.93       Cesar John FNP 02/10/2021

 

                                        Neomycin/Polymyxin/Hydrocortisone (Otic)

                     3.5-19801-6 

Solution                   apply to ears everyday as needed itching 10ml        

                    

Agatha John FNP                    10/06/2020

 

                          Blue-Emu Super Strength                      Cream    

               as needed 

for muscle aches and cramping                                 Letha Herring M.D. 04/15/2019

 

                          Fluocinonide                     0.05% Ointment       

            apply twice a 

day as needed to rash on hands 15gm            L20.9           Agatha John FNP 2017

 

                    Omeprazole                     20mg Capsules DR             

      1 by mouth qd       

240caps                                 Letha Herring M.D. 

 

                                        Systane Preservative Free               

      0.4-0.3% Solution                 

             3 gtts. three times a day as needed                           Unkno

wn      

 

                          Mucinex                     600mg Tablets ER 12HR     

              1 by mouth 

twice a day as needed                                 Unknown         

 

                                        Gaviscon Extra Strength                 

    160-105mg Chewtabs                  

             as needed heartburn                           Unknown      

00

 

                          Sucralfate                     1gm Tablets            

       take 1 tablet by 

mouth before lunch and at bedtime, may also take prior to dinner if eating a 
known acid  270tabs                         Agatha John FNP 

000

 

                          Stool Softener                     100mg Capsules     

              take one 

capsule by mouth three times a day as needed to soften stool                    

             Unknown         



 

                          Doxycycline Hyclate                     20mg Tablets  

                 daily for

gum disease     90tabs                          Agatha John FNP 

 

                                        Enalapril Maleate/Hydrochlorothiazide   

                  5-12.5mg Tablets      

             daily        90tabs                    Letha Herring M.D. 

 

                Preparation H                     1-0.25-14.4-15% Cream         

          prn                             

                          Unknown                   

 

                          Centrum Silver                      Tablets           

        1 by mouth every 

day                                             Unknown         

 

                          Silver Sulfadiazine                     1% Cream      

             use on 

affected area as needed 50gm                            Agatha John FNP 

 

                          Benzoyl Peroxide-Erythromycin                     5-3%

 Gel                   

apply to acne at bedtime, wash off in the morning 46.600gm                      

          Agatha John FNP                                     







Immunizations





             CPT Code     Status       Date         Vaccine      Lot #

 

             [id number]        Given        2020   Pneumococcal Vaccine  

 

                [id number]           Given           10/25/2019      Boostrix (Tdap) 

Tetnus, Diphtheria Toxoids & Acellular 

Pertussis                               [id number]

 

                [id number]           Given           10/03/2019      Influenza Virus 

Vaccine, Quadrivalent,age 3 and 

up,multidose vial                       [id number]

 

                [id number]           Given           10/15/2018      Influenza Virus 

Vaccine, Quadrivalent,age 3 and 

up,multidose vial                       [id number]

 

             [id number]        Given        10/09/2017   Influenza Vaccination [id number]

C

 

             11467        Given        10/06/2016   Influenza Vaccination [id number]

C

 

             U-Td         Given        2005   Td(Adult),Unspecified  







Vital Signs





                Date            Vital           Result          Comment

 

                02/10/2021  9:55am BP Systolic     162 mmHg         

 

                    BP Diastolic        89 mmHg              

 

                    BP Systolic Recheck 128 mmHg             

 

                    BP Diastolic Recheck 79 mmHg              

 

                    Heart Rate          81 /min              

 

                    Body Temperature    97.3 F             

 

                    Respiratory Rate    16 /min              

 

                    Height              64 inches           5'4"

 

                    Weight              125.38 lb            

 

                    O2 % BldC Oximetry  98 %                 

 

                    Peak Expiratory Flow Rate 337                 Estimated Peak

 Flow Rate

 

                    Ideal Body Weight   120 lb               

 

                    BMI (Body Mass Index) 21.5 kg/m2           

 

                10/22/2020 10:37am BP Systolic     140 mmHg         

 

                    BP Diastolic        79 mmHg              

 

                    Heart Rate          73 /min              

 

                    Body Temperature    97.5 F             

 

                    Respiratory Rate    16 /min              

 

                    Height              64 inches           5'4"

 

                    Weight              127.38 lb            

 

                    Peak Expiratory Flow Rate 337                 Estimated Peak

 Flow Rate

 

                    Ideal Body Weight   120 lb               

 

                    BMI (Body Mass Index) 21.9 kg/m2           







Results





        Test    Acquired Date Facility Test    Result  H/L     Range   Note

 

                    Laboratory test finding 10/22/2020          [facility]

           [phone] Urine Culture FULL REPORT IN L <SEE NOTE>  Normal     

           1

 

                    Ua Routine          10/22/2020          [facility]

                                        [id number]

[place], [place] [id number] [phone] Ua Specific [place] 1.00                              

 

             Ua PH        5                                       

 

             Ua Color     yellow                                  

 

             Ua Appera    clear                                   

 

             Ua WBC       trace                                   

 

             Ua Protein   neg.                                    

 

             Ua Glucose   neg.                                    

 

             Ua Ketones   neg.                                    

 

             Ua Bilirubin neg.                                    

 

             Ua Urobilinogen neg.                                    

 

             Ua Nitrite   neg.                                    

 

             Ua Occult Blood trace                                   

 

                    CBC With Differential 2020          [facility]

[facility] [place] [address]

           [phone] White Blood Count 5.3 10     Normal     4.0-10.0    

 

             Red Blood Count 4.06 10      Normal       4.00-5.40     

 

             Hemoglobin   12.8 g/dL    Normal       12.0-15.5     

 

             Hematocrit   38.7 %       Normal       36.0-47.0     

 

             Mean Corpuscular Volume 95.3 fl      Normal       80.0-96.0     

 

             Mean Corpuscular Hemoglobin 31.5 pg      Normal       27.0-33.0    

 

 

             Mean Corpuscular HGB Conc 33.1 g/dL    Normal       32.0-36.5     

 

             Red Cell Distribution Width 12.2 %       Normal       11.5-14.5    

 

 

             Platelet Count, Automated 296 10       Normal       150-450       

 

             Neutrophils % 45.6 %       Normal       36.0-66.0     

 

             Lymph %      44.2 %       High         24.0-44.0     

 

             Mono %       7.2 %        High         0.0-5.0       

 

             Eos %        1.7 %        Normal       0.0-3.0       

 

             Baso %       0.9 %        Normal       0.0-1.0       

 

             Immature Granulocyte % 0.4 %        Normal       0-3.0         

 

             Nucleated Red Blood Cell % 0.0 %        Normal       0-0           

 

             Neutrophils # 2.4 10       Normal       1.5-8.5       

 

             Lymph #      2.3 10       Normal       1.5-5.0       

 

             Mono #       0.4 10       Normal       0.0-0.8       

 

             Eos #        0.1 10       Normal       0.0-0.5       

 

             Baso #       0.1 10       Normal       0.0-0.2       

 

                    Comprehensive Metabolic Profil 2020          [facility] [place]

[place] [address] [phone] Glucose, Fasting 97 mg/dL   Normal           

 

             Blood Urea Nitrogen 16 mg/dL     Normal       7-18          

 

             Creatinine For GFR 0.60 mg/dL   Normal       0.55-1.30     

 

             Glomerular Filtration Rate > 60.0       Normal       >45          2

 

             Sodium Level 143 mEq/L    Normal       136-145       

 

             Potassium Serum 3.6 mEq/L    Normal       3.5-5.1       

 

             Chloride Level 109 mEq/L    High                 

 

             Carbon Dioxide Level 28 mEq/L     Normal       21-32         

 

             Anion Gap    6 mEq/L      Low          8-16          

 

             Calcium Level 8.7 mg/dL    Low          8.8-10.2      

 

             Ast/Sgot     13 U/L       Normal       7-37          

 

             Alt/SGPT     20 U/L       Normal       12-78         

 

             Alkaline Phosphatase 59 U/L       Normal               

 

             Bilirubin,Total 0.5 mg/dL    Normal       0.2-1.0       

 

             Total Protein 6.9 GM/DL    Normal       6.4-8.2       

 

             Albumin      3.7 GM/DL    Normal       3.2-5.2       

 

             Albumin/Globulin Ratio 1.2          Normal       1.2-2.2       

 

                    Lipid Panel         2020          [place] [address] [phone] Triglycerides Level 72 mg/dL   Normal     <150        

 

             Cholesterol Level 179 mg/dL    Normal       <200          

 

             HDL Cholesterol 58 mg/dL     Normal       >40           

 

             LDL Cholesterol 107 mg/dL    High         <100          

 

             Non-HDL-C    121 mg/dL    Normal                     

 

             Cholesterol Risk Ratio 3.086        Normal       <5            

 

                    Laboratory test finding 2020          [facility]

[place] [address]

           [phone] Thyroid Stimulating Hormone 2.670 uIU/ML Normal     0.

358-3.740  







                          1                         FULL REPORT IN LAB NOTES (eC

W and Medent).

NO GROWTH





 

                          2                         Units are mL/min/1.73 m2



Chronic Kidney Disease Staging per NKF:



Stage I & II   GFR >=60       Normal to Mildly Decreased

Stage III      GFR 30-59      Moderately Decreased

Stage IV       GFR 15-29      Severely Decreased

Stage V        GFR <15        Very Little GFR Left

ESRD           GFR <15 on RRT









Procedures





                Date            Code            Description     Status

 

                2020      [id number]        Mammogram       Completed







Medical Devices





                                        Description

 

                                        No Information Available







Encounters





           Type       Date       Location   Provider   Dx         Diagnosis

 

           Office Visit 02/10/2021 10:00a Main Office Agatha John, FNP H66.9

3     Otitis 

media, unspecified, bilateral

 

           Office Visit 10/22/2020  9:45a Main Office RayaachAgatha, FNP R31.9

      Hematuria,

unspecified

 

                          K64.9                     Unspecified hemorrhoids

 

           Office Visit 10/06/2020 10:45a Main Office Agatha John, FNP Z00.0

0     Encntr 

for general adult medical exam w/o abnormal findings

 

                          K21.9                     Gastro-esophageal reflux dis

ease without esophagitis

 

                          I10                       Essential (primary) hyperten

cass

 

                          M25.511                   Pain in right shoulder







Assessments





                Date            Code            Description     Provider

 

                02/10/2021      H66.93          Otitis media, unspecified, bilat

eral Agatha John, FNP

 

                10/22/2020      R31.9           Hematuria, unspecified Pleskach,

 Agatha, FNP

 

                10/22/2020      K64.9           Unspecified hemorrhoids Agatha John, FNP

 

                    10/06/2020          Z00.00              Encounter for genera

l adult medical examination without 

abnormal findings                       Agatha John, FNP

 

                10/06/2020      K21.9           Gastro-esophageal reflux disease

 without esophagitis Agatha John, FNP

 

                10/06/2020      I10             Essential (primary) hypertension

 Agatha John, FNP

 

                10/06/2020      M25.511         Pain in right shoulder Agatha John FNP







Plan of Treatment

Future Appointment(s):* 2021  9:45 am - Letha Herring M.D. at Main 
  Office

02/10/2021 - Agatha John FNP* H66.93 Otitis media, unspecified, bilateral* 
  New Medication:* Azithromycin 250 mg - 2 tabs today then one tab daily for 4 
  days

* Levocetirizine Dihydrochloride 5 mg - 1 tab by mouth every evening









Functional Status





                Functional Condition Comment         Date            Status

 

                Partial upper dentures                                 Active

 

                Independent with all ADL's                                 Activ

e

 

                Independent with all IADL's                                 Acti

ve

 

                Bifocal glasses                                 Active







Mental Status





                Mental Condition Comment         Date            Status

 

                None                                            Active







Referrals





                                        Description

 

                                        No Information Available

## 2021-10-05 ENCOUNTER — HOSPITAL ENCOUNTER (OUTPATIENT)
Dept: HOSPITAL 53 - M LAB | Age: 66
End: 2021-10-05
Attending: NURSE PRACTITIONER
Payer: COMMERCIAL

## 2021-10-05 DIAGNOSIS — I10: ICD-10-CM

## 2021-10-05 DIAGNOSIS — R73.01: Primary | ICD-10-CM

## 2021-10-05 LAB
ALBUMIN SERPL BCG-MCNC: 3.8 GM/DL (ref 3.2–5.2)
ALT SERPL W P-5'-P-CCNC: 25 U/L (ref 12–78)
BILIRUB SERPL-MCNC: 0.3 MG/DL (ref 0.2–1)
BUN SERPL-MCNC: 17 MG/DL (ref 7–18)
CALCIUM SERPL-MCNC: 9.3 MG/DL (ref 8.8–10.2)
CHLORIDE SERPL-SCNC: 107 MEQ/L (ref 98–107)
CHOLEST SERPL-MCNC: 175 MG/DL (ref ?–200)
CHOLEST/HDLC SERPL: 2.92 {RATIO} (ref ?–5)
CO2 SERPL-SCNC: 29 MEQ/L (ref 21–32)
CREAT SERPL-MCNC: 0.58 MG/DL (ref 0.55–1.3)
EST. AVERAGE GLUCOSE BLD GHB EST-MCNC: 105 MG/DL (ref 60–110)
GFR SERPL CREATININE-BSD FRML MDRD: > 60 ML/MIN/{1.73_M2} (ref 45–?)
GLUCOSE SERPL-MCNC: 105 MG/DL (ref 70–100)
HDLC SERPL-MCNC: 60 MG/DL (ref 40–?)
LDLC SERPL CALC-MCNC: 96 MG/DL (ref ?–100)
NONHDLC SERPL-MCNC: 115 MG/DL
POTASSIUM SERPL-SCNC: 3.8 MEQ/L (ref 3.5–5.1)
PROT SERPL-MCNC: 6.9 GM/DL (ref 6.4–8.2)
SODIUM SERPL-SCNC: 140 MEQ/L (ref 136–145)
TRIGL SERPL-MCNC: 97 MG/DL (ref ?–150)

## 2022-02-15 ENCOUNTER — HOSPITAL ENCOUNTER (EMERGENCY)
Dept: HOSPITAL 53 - M ED | Age: 67
Discharge: HOME | End: 2022-02-15
Payer: COMMERCIAL

## 2022-02-15 VITALS — DIASTOLIC BLOOD PRESSURE: 79 MMHG | SYSTOLIC BLOOD PRESSURE: 146 MMHG

## 2022-02-15 VITALS — WEIGHT: 127.27 LBS | HEIGHT: 64 IN | BODY MASS INDEX: 21.73 KG/M2

## 2022-02-15 DIAGNOSIS — Z79.899: ICD-10-CM

## 2022-02-15 DIAGNOSIS — Z88.8: ICD-10-CM

## 2022-02-15 DIAGNOSIS — K21.9: ICD-10-CM

## 2022-02-15 DIAGNOSIS — N30.90: Primary | ICD-10-CM

## 2022-02-15 LAB
APPEARANCE UR: CLEAR
BACTERIA UR QL AUTO: (no result)
BILIRUB UR QL STRIP.AUTO: NEGATIVE
GLUCOSE UR QL STRIP.AUTO: NEGATIVE MG/DL
HGB UR QL STRIP.AUTO: (no result)
KETONES UR QL STRIP.AUTO: NEGATIVE MG/DL
LEUKOCYTE ESTERASE UR QL STRIP.AUTO: (no result)
NITRITE UR QL STRIP.AUTO: POSITIVE
PH UR STRIP.AUTO: 5 UNITS (ref 5–9)
PROT UR QL STRIP.AUTO: (no result) MG/DL
RBC # UR AUTO: (no result) /HPF (ref 0–3)
SP GR UR STRIP.AUTO: 1.02 (ref 1–1.03)
SQUAMOUS #/AREA URNS AUTO: 2 /HPF (ref 0–6)
UROBILINOGEN UR QL STRIP.AUTO: 0.2 MG/DL (ref 0–2)
WBC #/AREA URNS AUTO: (no result) /HPF (ref 0–3)

## 2022-02-20 ENCOUNTER — HOSPITAL ENCOUNTER (EMERGENCY)
Dept: HOSPITAL 53 - M ED | Age: 67
Discharge: HOME | End: 2022-02-20
Payer: COMMERCIAL

## 2022-02-20 VITALS — WEIGHT: 125.27 LBS | BODY MASS INDEX: 21.39 KG/M2 | HEIGHT: 64 IN

## 2022-02-20 VITALS — DIASTOLIC BLOOD PRESSURE: 74 MMHG | SYSTOLIC BLOOD PRESSURE: 134 MMHG

## 2022-02-20 DIAGNOSIS — I10: ICD-10-CM

## 2022-02-20 DIAGNOSIS — N30.91: Primary | ICD-10-CM

## 2022-02-20 DIAGNOSIS — K21.9: ICD-10-CM

## 2022-02-20 DIAGNOSIS — F17.210: ICD-10-CM

## 2022-02-20 DIAGNOSIS — Z88.8: ICD-10-CM

## 2022-02-20 DIAGNOSIS — Z79.899: ICD-10-CM

## 2022-09-15 ENCOUNTER — HOSPITAL ENCOUNTER (OUTPATIENT)
Dept: HOSPITAL 53 - M LAB | Age: 67
End: 2022-09-15
Payer: COMMERCIAL

## 2022-09-15 DIAGNOSIS — R73.01: Primary | ICD-10-CM

## 2022-09-15 LAB
ALBUMIN SERPL BCG-MCNC: 3.9 GM/DL (ref 3.2–5.2)
ALT SERPL W P-5'-P-CCNC: 22 U/L (ref 12–78)
BASOPHILS # BLD AUTO: 0.1 10^3/UL (ref 0–0.2)
BASOPHILS NFR BLD AUTO: 1.6 % (ref 0–1)
BILIRUB SERPL-MCNC: 0.5 MG/DL (ref 0.2–1)
BUN SERPL-MCNC: 18 MG/DL (ref 7–18)
CALCIUM SERPL-MCNC: 9.1 MG/DL (ref 8.8–10.2)
CHLORIDE SERPL-SCNC: 106 MEQ/L (ref 98–107)
CHOLEST SERPL-MCNC: 189 MG/DL (ref ?–200)
CHOLEST/HDLC SERPL: 3.15 {RATIO} (ref ?–5)
CO2 SERPL-SCNC: 31 MEQ/L (ref 21–32)
CREAT SERPL-MCNC: 0.61 MG/DL (ref 0.55–1.3)
EOSINOPHIL # BLD AUTO: 0.1 10^3/UL (ref 0–0.5)
EOSINOPHIL NFR BLD AUTO: 2.1 % (ref 0–3)
EST. AVERAGE GLUCOSE BLD GHB EST-MCNC: 114 MG/DL (ref 60–110)
GFR SERPL CREATININE-BSD FRML MDRD: > 60 ML/MIN/{1.73_M2} (ref 45–?)
GLUCOSE SERPL-MCNC: 91 MG/DL (ref 70–100)
HCT VFR BLD AUTO: 37.7 % (ref 36–47)
HDLC SERPL-MCNC: 60 MG/DL (ref 40–?)
HGB BLD-MCNC: 12.5 G/DL (ref 12–15.5)
LDLC SERPL CALC-MCNC: 107 MG/DL (ref ?–100)
LYMPHOCYTES # BLD AUTO: 1.8 10^3/UL (ref 1.5–5)
LYMPHOCYTES NFR BLD AUTO: 41.7 % (ref 24–44)
MCH RBC QN AUTO: 31.8 PG (ref 27–33)
MCHC RBC AUTO-ENTMCNC: 33.2 G/DL (ref 32–36.5)
MCV RBC AUTO: 95.9 FL (ref 80–96)
MONOCYTES # BLD AUTO: 0.3 10^3/UL (ref 0–0.8)
MONOCYTES NFR BLD AUTO: 6.2 % (ref 2–8)
NEUTROPHILS # BLD AUTO: 2.1 10^3/UL (ref 1.5–8.5)
NEUTROPHILS NFR BLD AUTO: 48.2 % (ref 36–66)
NONHDLC SERPL-MCNC: 129 MG/DL
PLATELET # BLD AUTO: 291 10^3/UL (ref 150–450)
POTASSIUM SERPL-SCNC: 3.9 MEQ/L (ref 3.5–5.1)
PROT SERPL-MCNC: 7.1 GM/DL (ref 6.4–8.2)
RBC # BLD AUTO: 3.93 10^6/UL (ref 4–5.4)
SODIUM SERPL-SCNC: 140 MEQ/L (ref 136–145)
TRIGL SERPL-MCNC: 108 MG/DL (ref ?–150)
WBC # BLD AUTO: 4.4 10^3/UL (ref 4–10)

## 2022-09-20 ENCOUNTER — HOSPITAL ENCOUNTER (OUTPATIENT)
Dept: HOSPITAL 53 - M LAB REF | Age: 67
End: 2022-09-20
Attending: NURSE PRACTITIONER
Payer: COMMERCIAL

## 2022-09-20 DIAGNOSIS — R31.9: Primary | ICD-10-CM

## 2023-03-03 ENCOUNTER — HOSPITAL ENCOUNTER (OUTPATIENT)
Dept: HOSPITAL 53 - M RAD | Age: 68
End: 2023-03-03
Attending: NURSE PRACTITIONER
Payer: MEDICARE

## 2023-03-03 DIAGNOSIS — N83.292: Primary | ICD-10-CM

## 2023-08-02 ENCOUNTER — HOSPITAL ENCOUNTER (EMERGENCY)
Dept: HOSPITAL 53 - M ED | Age: 68
Discharge: HOME | End: 2023-08-02
Payer: MEDICARE

## 2023-08-02 VITALS — DIASTOLIC BLOOD PRESSURE: 88 MMHG | OXYGEN SATURATION: 98 % | SYSTOLIC BLOOD PRESSURE: 158 MMHG

## 2023-08-02 VITALS — WEIGHT: 125.22 LBS | HEIGHT: 64 IN | BODY MASS INDEX: 21.38 KG/M2

## 2023-08-02 VITALS — TEMPERATURE: 98.7 F

## 2023-08-02 DIAGNOSIS — Z79.899: ICD-10-CM
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
## 2023-08-02 DIAGNOSIS — M17.9: ICD-10-CM

## 2023-08-02 DIAGNOSIS — S76.011A: Primary | ICD-10-CM
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
## 2023-08-02 DIAGNOSIS — K21.9: ICD-10-CM

## 2023-08-02 DIAGNOSIS — Z88.6: ICD-10-CM
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
## 2023-08-02 DIAGNOSIS — I10: ICD-10-CM

## 2023-09-12 ENCOUNTER — HOSPITAL ENCOUNTER (OUTPATIENT)
Dept: HOSPITAL 53 - M PLALAB | Age: 68
End: 2023-09-12
Attending: INTERNAL MEDICINE
Payer: MEDICARE

## 2023-09-12 DIAGNOSIS — E55.9: Primary | ICD-10-CM

## 2023-09-22 ENCOUNTER — HOSPITAL ENCOUNTER (OUTPATIENT)
Dept: HOSPITAL 53 - M LAB | Age: 68
End: 2023-09-22
Attending: NURSE PRACTITIONER
Payer: MEDICARE

## 2023-09-22 DIAGNOSIS — R73.03: ICD-10-CM

## 2023-09-22 DIAGNOSIS — I10: Primary | ICD-10-CM

## 2023-09-22 LAB
ALBUMIN SERPL BCG-MCNC: 3.8 G/DL (ref 3.2–5.2)
ALP SERPL-CCNC: 50 U/L (ref 46–116)
ALT SERPL W P-5'-P-CCNC: 18 U/L (ref 7–40)
AST SERPL-CCNC: 20 U/L (ref ?–34)
BILIRUB SERPL-MCNC: 0.6 MG/DL (ref 0.3–1.2)
BUN SERPL-MCNC: 20 MG/DL (ref 9–23)
CALCIUM SERPL-MCNC: 8.8 MG/DL (ref 8.3–10.6)
CHLORIDE SERPL-SCNC: 105 MMOL/L (ref 98–107)
CHOLEST SERPL-MCNC: 186 MG/DL (ref ?–200)
CHOLEST/HDLC SERPL: 3.11 {RATIO} (ref ?–5)
CO2 SERPL-SCNC: 28 MMOL/L (ref 20–31)
CREAT SERPL-MCNC: 0.53 MG/DL (ref 0.55–1.3)
CREAT UR-MCNC: 44.8 MG/DL
EST. AVERAGE GLUCOSE BLD GHB EST-MCNC: 94 MG/DL (ref 60–110)
GFR SERPL CREATININE-BSD FRML MDRD: > 60 ML/MIN/{1.73_M2} (ref 45–?)
GLUCOSE SERPL-MCNC: 99 MG/DL (ref 74–106)
HDLC SERPL-MCNC: 59.8 MG/DL (ref 40–?)
LDLC SERPL CALC-MCNC: 114.8 MG/DL (ref ?–100)
MICROALBUMIN UR-MCNC: 3 MG/L
MICROALBUMIN/CREAT UR: 6.6 MCG/MG (ref 0–30)
NONHDLC SERPL-MCNC: 126.2 MG/DL
POTASSIUM SERPL-SCNC: 3.9 MMOL/L (ref 3.5–5.1)
PROT SERPL-MCNC: 6.7 G/DL (ref 5.7–8.2)
SODIUM SERPL-SCNC: 141 MMOL/L (ref 136–145)
TRIGL SERPL-MCNC: 57 MG/DL (ref ?–150)

## 2024-09-10 ENCOUNTER — HOSPITAL ENCOUNTER (OUTPATIENT)
Dept: HOSPITAL 53 - M LAB | Age: 69
End: 2024-09-10
Attending: NURSE PRACTITIONER
Payer: MEDICARE

## 2024-09-10 DIAGNOSIS — I10: Primary | ICD-10-CM

## 2024-09-10 DIAGNOSIS — R73.03: ICD-10-CM

## 2024-09-10 LAB
ALBUMIN SERPL BCG-MCNC: 3.9 G/DL (ref 3.2–5.2)
ALP SERPL-CCNC: 58 U/L (ref 46–116)
ALT SERPL W P-5'-P-CCNC: 18 U/L (ref 7–40)
AST SERPL-CCNC: 11 U/L (ref ?–34)
BILIRUB SERPL-MCNC: 0.7 MG/DL (ref 0.3–1.2)
BUN SERPL-MCNC: 19 MG/DL (ref 9–23)
CALCIUM SERPL-MCNC: 9 MG/DL (ref 8.3–10.6)
CHLORIDE SERPL-SCNC: 107 MMOL/L (ref 98–107)
CHOLEST SERPL-MCNC: 205 MG/DL (ref ?–200)
CHOLEST/HDLC SERPL: 3.45 {RATIO} (ref ?–5)
CO2 SERPL-SCNC: 30 MMOL/L (ref 20–31)
CREAT SERPL-MCNC: 0.58 MG/DL (ref 0.55–1.3)
CREAT UR-MCNC: 115.8 MG/DL
EST. AVERAGE GLUCOSE BLD GHB EST-MCNC: 105 MG/DL (ref 60–110)
GFR SERPL CREATININE-BSD FRML MDRD: > 60 ML/MIN/{1.73_M2} (ref 45–?)
GLUCOSE SERPL-MCNC: 92 MG/DL (ref 74–106)
HDLC SERPL-MCNC: 59.4 MG/DL (ref 40–?)
LDLC SERPL CALC-MCNC: 130 MG/DL (ref ?–100)
MICROALBUMIN UR-MCNC: < 3 MG/L
MICROALBUMIN/CREAT UR: 2.5 MCG/MG (ref 0–30)
NONHDLC SERPL-MCNC: 145.6 MG/DL
POTASSIUM SERPL-SCNC: 4 MMOL/L (ref 3.5–5.1)
PROT SERPL-MCNC: 6.8 G/DL (ref 5.7–8.2)
SODIUM SERPL-SCNC: 140 MMOL/L (ref 136–145)
TRIGL SERPL-MCNC: 78 MG/DL (ref ?–150)

## 2025-02-02 ENCOUNTER — HOSPITAL ENCOUNTER (EMERGENCY)
Dept: HOSPITAL 53 - M ED | Age: 70
Discharge: HOME | End: 2025-02-02
Payer: MEDICARE

## 2025-02-02 VITALS — SYSTOLIC BLOOD PRESSURE: 126 MMHG | TEMPERATURE: 97.8 F | DIASTOLIC BLOOD PRESSURE: 79 MMHG | OXYGEN SATURATION: 95 %

## 2025-02-02 VITALS — WEIGHT: 127.87 LBS | HEIGHT: 64 IN | BODY MASS INDEX: 21.83 KG/M2

## 2025-02-02 DIAGNOSIS — Z79.51: ICD-10-CM

## 2025-02-02 DIAGNOSIS — K21.9: ICD-10-CM

## 2025-02-02 DIAGNOSIS — Z79.52: ICD-10-CM

## 2025-02-02 DIAGNOSIS — R05.9: Primary | ICD-10-CM

## 2025-02-02 DIAGNOSIS — I10: ICD-10-CM

## 2025-02-02 DIAGNOSIS — B97.4: ICD-10-CM

## 2025-02-02 DIAGNOSIS — Z88.8: ICD-10-CM

## 2025-02-02 DIAGNOSIS — Z79.810: ICD-10-CM

## 2025-02-02 DIAGNOSIS — Z79.899: ICD-10-CM

## 2025-02-09 ENCOUNTER — HOSPITAL ENCOUNTER (EMERGENCY)
Dept: HOSPITAL 53 - M ED | Age: 70
Discharge: HOME | End: 2025-02-09
Payer: MEDICARE

## 2025-02-09 VITALS — SYSTOLIC BLOOD PRESSURE: 159 MMHG | OXYGEN SATURATION: 97 % | TEMPERATURE: 98.3 F | DIASTOLIC BLOOD PRESSURE: 72 MMHG

## 2025-02-09 VITALS — HEIGHT: 64 IN | WEIGHT: 127.65 LBS | BODY MASS INDEX: 21.79 KG/M2

## 2025-02-09 DIAGNOSIS — J20.9: Primary | ICD-10-CM

## 2025-02-09 DIAGNOSIS — R09.81: ICD-10-CM

## 2025-02-09 DIAGNOSIS — Z79.52: ICD-10-CM

## 2025-02-09 DIAGNOSIS — Z88.6: ICD-10-CM

## 2025-02-09 DIAGNOSIS — Z88.8: ICD-10-CM

## 2025-02-09 DIAGNOSIS — Z79.899: ICD-10-CM

## 2025-03-01 ENCOUNTER — HOSPITAL ENCOUNTER (EMERGENCY)
Dept: HOSPITAL 53 - M ED | Age: 70
Discharge: HOME | End: 2025-03-01
Payer: MEDICARE

## 2025-03-01 VITALS — HEIGHT: 64 IN | BODY MASS INDEX: 21.68 KG/M2 | WEIGHT: 126.99 LBS

## 2025-03-01 VITALS — TEMPERATURE: 98.7 F

## 2025-03-01 VITALS — SYSTOLIC BLOOD PRESSURE: 158 MMHG | DIASTOLIC BLOOD PRESSURE: 86 MMHG | OXYGEN SATURATION: 97 %

## 2025-03-01 DIAGNOSIS — H57.89: Primary | ICD-10-CM

## 2025-03-01 DIAGNOSIS — Z88.6: ICD-10-CM

## 2025-03-01 DIAGNOSIS — Z79.810: ICD-10-CM

## 2025-03-01 DIAGNOSIS — Z79.899: ICD-10-CM

## 2025-03-01 DIAGNOSIS — K21.9: ICD-10-CM

## 2025-03-01 DIAGNOSIS — I10: ICD-10-CM

## 2025-03-01 DIAGNOSIS — Z88.8: ICD-10-CM

## 2025-03-01 DIAGNOSIS — Z79.51: ICD-10-CM

## 2025-03-15 ENCOUNTER — HOSPITAL ENCOUNTER (EMERGENCY)
Dept: HOSPITAL 53 - M ED | Age: 70
Discharge: HOME | End: 2025-03-15
Payer: MEDICARE

## 2025-03-15 VITALS — SYSTOLIC BLOOD PRESSURE: 132 MMHG | DIASTOLIC BLOOD PRESSURE: 62 MMHG | OXYGEN SATURATION: 98 % | TEMPERATURE: 98 F

## 2025-03-15 VITALS — HEIGHT: 64 IN | WEIGHT: 127.21 LBS | BODY MASS INDEX: 21.72 KG/M2

## 2025-03-15 DIAGNOSIS — Z79.51: ICD-10-CM

## 2025-03-15 DIAGNOSIS — Z88.6: ICD-10-CM

## 2025-03-15 DIAGNOSIS — Z79.810: ICD-10-CM

## 2025-03-15 DIAGNOSIS — Z88.8: ICD-10-CM

## 2025-03-15 DIAGNOSIS — H11.31: Primary | ICD-10-CM

## 2025-03-15 DIAGNOSIS — Z79.899: ICD-10-CM

## 2025-03-15 RX ADMIN — FLUORESCEIN SODIUM ONE MG: 1 STRIP OPHTHALMIC at 20:25

## 2025-03-15 RX ADMIN — PROPARACAINE HYDROCHLORIDE ONE DROP: 5 SOLUTION/ DROPS OPHTHALMIC at 20:25
